# Patient Record
Sex: FEMALE | Race: WHITE | NOT HISPANIC OR LATINO | Employment: PART TIME | URBAN - METROPOLITAN AREA
[De-identification: names, ages, dates, MRNs, and addresses within clinical notes are randomized per-mention and may not be internally consistent; named-entity substitution may affect disease eponyms.]

---

## 2022-03-16 ENCOUNTER — TELEPHONE (OUTPATIENT)
Dept: SCHEDULING | Facility: IMAGING CENTER | Age: 22
End: 2022-03-16

## 2022-03-21 ENCOUNTER — HOSPITAL ENCOUNTER (OUTPATIENT)
Facility: MEDICAL CENTER | Age: 22
End: 2022-03-21
Attending: CLINICAL NURSE SPECIALIST
Payer: COMMERCIAL

## 2022-03-21 ENCOUNTER — OFFICE VISIT (OUTPATIENT)
Dept: MEDICAL GROUP | Facility: IMAGING CENTER | Age: 22
End: 2022-03-21
Payer: COMMERCIAL

## 2022-03-21 ENCOUNTER — HOSPITAL ENCOUNTER (OUTPATIENT)
Dept: LAB | Facility: MEDICAL CENTER | Age: 22
End: 2022-03-21
Attending: CLINICAL NURSE SPECIALIST
Payer: COMMERCIAL

## 2022-03-21 VITALS
TEMPERATURE: 97.9 F | HEIGHT: 63 IN | HEART RATE: 77 BPM | BODY MASS INDEX: 31.01 KG/M2 | OXYGEN SATURATION: 97 % | DIASTOLIC BLOOD PRESSURE: 50 MMHG | WEIGHT: 175 LBS | SYSTOLIC BLOOD PRESSURE: 104 MMHG

## 2022-03-21 DIAGNOSIS — Z13.31 DEPRESSION SCREENING: ICD-10-CM

## 2022-03-21 DIAGNOSIS — Z76.89 ENCOUNTER TO ESTABLISH CARE WITH NEW DOCTOR: ICD-10-CM

## 2022-03-21 DIAGNOSIS — F41.9 ANXIETY AND DEPRESSION: ICD-10-CM

## 2022-03-21 DIAGNOSIS — Z11.3 SCREENING EXAMINATION FOR SEXUALLY TRANSMITTED DISEASE: ICD-10-CM

## 2022-03-21 DIAGNOSIS — Z20.5 EXPOSURE TO HEPATITIS B: ICD-10-CM

## 2022-03-21 DIAGNOSIS — F32.A ANXIETY AND DEPRESSION: ICD-10-CM

## 2022-03-21 DIAGNOSIS — R79.89 HIGH SERUM TRIIODOTHYRONINE (T3): ICD-10-CM

## 2022-03-21 DIAGNOSIS — N92.6 IRREGULAR PERIODS: ICD-10-CM

## 2022-03-21 LAB
AMBIGUOUS DTTM AMBI4: NORMAL
BASOPHILS # BLD AUTO: 0.9 % (ref 0–1.8)
BASOPHILS # BLD: 0.05 K/UL (ref 0–0.12)
EOSINOPHIL # BLD AUTO: 0.11 K/UL (ref 0–0.51)
EOSINOPHIL NFR BLD: 2.1 % (ref 0–6.9)
ERYTHROCYTE [DISTWIDTH] IN BLOOD BY AUTOMATED COUNT: 41.1 FL (ref 35.9–50)
EST. AVERAGE GLUCOSE BLD GHB EST-MCNC: 103 MG/DL
HAV IGM SERPL QL IA: NORMAL
HBA1C MFR BLD: 5.2 % (ref 4–5.6)
HBV CORE IGM SER QL: NORMAL
HBV SURFACE AG SER QL: NORMAL
HCT VFR BLD AUTO: 43.6 % (ref 37–47)
HCV AB SER QL: NORMAL
HGB BLD-MCNC: 14.7 G/DL (ref 12–16)
IMM GRANULOCYTES # BLD AUTO: 0.01 K/UL (ref 0–0.11)
IMM GRANULOCYTES NFR BLD AUTO: 0.2 % (ref 0–0.9)
LYMPHOCYTES # BLD AUTO: 1.71 K/UL (ref 1–4.8)
LYMPHOCYTES NFR BLD: 32.3 % (ref 22–41)
MCH RBC QN AUTO: 31.1 PG (ref 27–33)
MCHC RBC AUTO-ENTMCNC: 33.7 G/DL (ref 33.6–35)
MCV RBC AUTO: 92.2 FL (ref 81.4–97.8)
MONOCYTES # BLD AUTO: 0.38 K/UL (ref 0–0.85)
MONOCYTES NFR BLD AUTO: 7.2 % (ref 0–13.4)
NEUTROPHILS # BLD AUTO: 3.04 K/UL (ref 2–7.15)
NEUTROPHILS NFR BLD: 57.3 % (ref 44–72)
NRBC # BLD AUTO: 0 K/UL
NRBC BLD-RTO: 0 /100 WBC
PLATELET # BLD AUTO: 281 K/UL (ref 164–446)
PMV BLD AUTO: 9.6 FL (ref 9–12.9)
RBC # BLD AUTO: 4.73 M/UL (ref 4.2–5.4)
T PALLIDUM AB SER QL IA: NORMAL
WBC # BLD AUTO: 5.3 K/UL (ref 4.8–10.8)

## 2022-03-21 PROCEDURE — 87389 HIV-1 AG W/HIV-1&-2 AB AG IA: CPT

## 2022-03-21 PROCEDURE — 99204 OFFICE O/P NEW MOD 45 MIN: CPT | Performed by: CLINICAL NURSE SPECIALIST

## 2022-03-21 PROCEDURE — 87491 CHLMYD TRACH DNA AMP PROBE: CPT

## 2022-03-21 PROCEDURE — 84443 ASSAY THYROID STIM HORMONE: CPT

## 2022-03-21 PROCEDURE — 87591 N.GONORRHOEAE DNA AMP PROB: CPT

## 2022-03-21 PROCEDURE — 86780 TREPONEMA PALLIDUM: CPT

## 2022-03-21 PROCEDURE — 82306 VITAMIN D 25 HYDROXY: CPT

## 2022-03-21 PROCEDURE — 85025 COMPLETE CBC W/AUTO DIFF WBC: CPT

## 2022-03-21 PROCEDURE — 84439 ASSAY OF FREE THYROXINE: CPT

## 2022-03-21 PROCEDURE — 84442 ASSAY OF THYROID ACTIVITY: CPT

## 2022-03-21 PROCEDURE — 36415 COLL VENOUS BLD VENIPUNCTURE: CPT

## 2022-03-21 PROCEDURE — 80074 ACUTE HEPATITIS PANEL: CPT

## 2022-03-21 PROCEDURE — 84480 ASSAY TRIIODOTHYRONINE (T3): CPT

## 2022-03-21 PROCEDURE — 83036 HEMOGLOBIN GLYCOSYLATED A1C: CPT

## 2022-03-21 RX ORDER — NORETHINDRONE ACETATE AND ETHINYL ESTRADIOL 1MG-20(21)
KIT ORAL
COMMUNITY
Start: 2022-03-06 | End: 2022-06-10 | Stop reason: SDUPTHER

## 2022-03-21 SDOH — ECONOMIC STABILITY: HOUSING INSECURITY
IN THE LAST 12 MONTHS, WAS THERE A TIME WHEN YOU DID NOT HAVE A STEADY PLACE TO SLEEP OR SLEPT IN A SHELTER (INCLUDING NOW)?: NO

## 2022-03-21 SDOH — ECONOMIC STABILITY: TRANSPORTATION INSECURITY
IN THE PAST 12 MONTHS, HAS LACK OF TRANSPORTATION KEPT YOU FROM MEETINGS, WORK, OR FROM GETTING THINGS NEEDED FOR DAILY LIVING?: NO

## 2022-03-21 SDOH — ECONOMIC STABILITY: INCOME INSECURITY: IN THE LAST 12 MONTHS, WAS THERE A TIME WHEN YOU WERE NOT ABLE TO PAY THE MORTGAGE OR RENT ON TIME?: NO

## 2022-03-21 SDOH — ECONOMIC STABILITY: TRANSPORTATION INSECURITY
IN THE PAST 12 MONTHS, HAS LACK OF RELIABLE TRANSPORTATION KEPT YOU FROM MEDICAL APPOINTMENTS, MEETINGS, WORK OR FROM GETTING THINGS NEEDED FOR DAILY LIVING?: NO

## 2022-03-21 SDOH — ECONOMIC STABILITY: FOOD INSECURITY: WITHIN THE PAST 12 MONTHS, YOU WORRIED THAT YOUR FOOD WOULD RUN OUT BEFORE YOU GOT MONEY TO BUY MORE.: NEVER TRUE

## 2022-03-21 SDOH — ECONOMIC STABILITY: HOUSING INSECURITY: IN THE LAST 12 MONTHS, HOW MANY PLACES HAVE YOU LIVED?: 1

## 2022-03-21 SDOH — ECONOMIC STABILITY: FOOD INSECURITY: WITHIN THE PAST 12 MONTHS, THE FOOD YOU BOUGHT JUST DIDN'T LAST AND YOU DIDN'T HAVE MONEY TO GET MORE.: NEVER TRUE

## 2022-03-21 SDOH — HEALTH STABILITY: PHYSICAL HEALTH: ON AVERAGE, HOW MANY MINUTES DO YOU ENGAGE IN EXERCISE AT THIS LEVEL?: 40 MIN

## 2022-03-21 SDOH — ECONOMIC STABILITY: TRANSPORTATION INSECURITY
IN THE PAST 12 MONTHS, HAS THE LACK OF TRANSPORTATION KEPT YOU FROM MEDICAL APPOINTMENTS OR FROM GETTING MEDICATIONS?: NO

## 2022-03-21 SDOH — HEALTH STABILITY: MENTAL HEALTH
STRESS IS WHEN SOMEONE FEELS TENSE, NERVOUS, ANXIOUS, OR CAN'T SLEEP AT NIGHT BECAUSE THEIR MIND IS TROUBLED. HOW STRESSED ARE YOU?: RATHER MUCH

## 2022-03-21 SDOH — ECONOMIC STABILITY: INCOME INSECURITY: HOW HARD IS IT FOR YOU TO PAY FOR THE VERY BASICS LIKE FOOD, HOUSING, MEDICAL CARE, AND HEATING?: NOT VERY HARD

## 2022-03-21 SDOH — HEALTH STABILITY: PHYSICAL HEALTH: ON AVERAGE, HOW MANY DAYS PER WEEK DO YOU ENGAGE IN MODERATE TO STRENUOUS EXERCISE (LIKE A BRISK WALK)?: 2 DAYS

## 2022-03-21 ASSESSMENT — ANXIETY QUESTIONNAIRES
6. BECOMING EASILY ANNOYED OR IRRITABLE: NOT AT ALL
5. BEING SO RESTLESS THAT IT IS HARD TO SIT STILL: NOT AT ALL
4. TROUBLE RELAXING: NEARLY EVERY DAY
GAD7 TOTAL SCORE: 9
7. FEELING AFRAID AS IF SOMETHING AWFUL MIGHT HAPPEN: SEVERAL DAYS
1. FEELING NERVOUS, ANXIOUS, OR ON EDGE: SEVERAL DAYS
2. NOT BEING ABLE TO STOP OR CONTROL WORRYING: SEVERAL DAYS
3. WORRYING TOO MUCH ABOUT DIFFERENT THINGS: NEARLY EVERY DAY

## 2022-03-21 ASSESSMENT — SOCIAL DETERMINANTS OF HEALTH (SDOH)
HOW OFTEN DO YOU ATTENT MEETINGS OF THE CLUB OR ORGANIZATION YOU BELONG TO?: NEVER
DO YOU BELONG TO ANY CLUBS OR ORGANIZATIONS SUCH AS CHURCH GROUPS UNIONS, FRATERNAL OR ATHLETIC GROUPS, OR SCHOOL GROUPS?: NO
HOW MANY DRINKS CONTAINING ALCOHOL DO YOU HAVE ON A TYPICAL DAY WHEN YOU ARE DRINKING: PATIENT DECLINED
HOW OFTEN DO YOU ATTEND CHURCH OR RELIGIOUS SERVICES?: NEVER
IN A TYPICAL WEEK, HOW MANY TIMES DO YOU TALK ON THE PHONE WITH FAMILY, FRIENDS, OR NEIGHBORS?: MORE THAN THREE TIMES A WEEK
ARE YOU MARRIED, WIDOWED, DIVORCED, SEPARATED, NEVER MARRIED, OR LIVING WITH A PARTNER?: NEVER MARRIED
HOW OFTEN DO YOU GET TOGETHER WITH FRIENDS OR RELATIVES?: ONCE A WEEK
HOW OFTEN DO YOU ATTEND CHURCH OR RELIGIOUS SERVICES?: NEVER
HOW OFTEN DO YOU HAVE A DRINK CONTAINING ALCOHOL: NEVER
IN A TYPICAL WEEK, HOW MANY TIMES DO YOU TALK ON THE PHONE WITH FAMILY, FRIENDS, OR NEIGHBORS?: MORE THAN THREE TIMES A WEEK
DO YOU BELONG TO ANY CLUBS OR ORGANIZATIONS SUCH AS CHURCH GROUPS UNIONS, FRATERNAL OR ATHLETIC GROUPS, OR SCHOOL GROUPS?: NO
ARE YOU MARRIED, WIDOWED, DIVORCED, SEPARATED, NEVER MARRIED, OR LIVING WITH A PARTNER?: NEVER MARRIED
HOW OFTEN DO YOU ATTENT MEETINGS OF THE CLUB OR ORGANIZATION YOU BELONG TO?: NEVER
HOW HARD IS IT FOR YOU TO PAY FOR THE VERY BASICS LIKE FOOD, HOUSING, MEDICAL CARE, AND HEATING?: NOT VERY HARD
HOW OFTEN DO YOU GET TOGETHER WITH FRIENDS OR RELATIVES?: ONCE A WEEK
HOW OFTEN DO YOU HAVE SIX OR MORE DRINKS ON ONE OCCASION: NEVER
WITHIN THE PAST 12 MONTHS, YOU WORRIED THAT YOUR FOOD WOULD RUN OUT BEFORE YOU GOT THE MONEY TO BUY MORE: NEVER TRUE

## 2022-03-21 ASSESSMENT — PAIN SCALES - GENERAL: PAINLEVEL: NO PAIN

## 2022-03-21 ASSESSMENT — LIFESTYLE VARIABLES
HOW OFTEN DO YOU HAVE A DRINK CONTAINING ALCOHOL: NEVER
HOW OFTEN DO YOU HAVE SIX OR MORE DRINKS ON ONE OCCASION: NEVER
HOW MANY STANDARD DRINKS CONTAINING ALCOHOL DO YOU HAVE ON A TYPICAL DAY: PATIENT DECLINED

## 2022-03-21 ASSESSMENT — PATIENT HEALTH QUESTIONNAIRE - PHQ9
5. POOR APPETITE OR OVEREATING: 1 - SEVERAL DAYS
CLINICAL INTERPRETATION OF PHQ2 SCORE: 1
SUM OF ALL RESPONSES TO PHQ QUESTIONS 1-9: 5

## 2022-03-21 NOTE — ASSESSMENT & PLAN NOTE
In past 3 months, breakthrough bleeding and skipped menses on birth control pill. No cramping.  No excessive body hair or deep voice.  No family history of PCOS.  Personal history of irregular thyroid per university provider.

## 2022-03-21 NOTE — PROGRESS NOTES
Subjective     Courtney Gayle is a 21 y.o. female who presents with Establish Care and Requesting Labs (Thyroid concern, has partner who has Hep B requesting booster vaccine)            HPI  BMI 31.0-31.9,adult  Weight gain without change in lifestyle.   Cold sensation especially feet and hands.  No fatigue, hair loss, constipation, dry skin, hair thinning, palpitations. Walks on campus but no exercise otherwise.  No dietary restrictions.  Meal preps one meal for week, last week mushroom chicken.  Sweets, fruit juice.  Minimal fried food.  No soda. No alcohol or cigarettes.      Anxiety and depression  History of intermittent anxiety and depression. Currently higher stress with school and work. She has seen therapy but has not been treated with medication.  She feels she is able to manage this herself and is not interested in any intervention at this time.     Irregular periods  In past 3 months, breakthrough bleeding and skipped menses on birth control pill. No cramping.  No excessive body hair or deep voice.  No family history of PCOS.  Personal history of irregular thyroid per university provider.     Exposure to hepatitis B  Courtney's boyfriend is positive for Hepatitis B. IN 2020, her HBV antibodies were negative despite vaccination.  She last saw her boyfriend 2 months ago.  She does not complain of abdominal pain.      ROS  See HPI      No Known Allergies    Current Outpatient Medications on File Prior to Visit   Medication Sig Dispense Refill   • BLISOVI FE 1/20 1-20 MG-MCG per tablet        No current facility-administered medications on file prior to visit.     Depression Screening    Little interest or pleasure in doing things?  0 - not at all   Feeling down, depressed , or hopeless? 1 - several days   Trouble falling or staying asleep, or sleeping too much?  0 - not at all   Feeling tired or having little energy?  2 - more than half the days   Poor appetite or overeating?  1 - several days   Feeling bad  "about yourself - or that you are a failure or have let yourself or your family down? 0 - not at all   Trouble concentrating on things, such as reading the newspaper or watching television? 1 - several days   Moving or speaking so slowly that other people could have noticed.  Or the opposite - being so fidgety or restless that you have been moving around a lot more than usual?  0 - not at all   Thoughts that you would be better off dead, or of hurting yourself?  0 - not at all   Patient Health Questionnaire Score: 5       If depressive symptoms identified deferred to follow up visit unless specifically addressed in assesment and plan.    Interpretation of PHQ-9 Total Score   Score Severity   1-4 No Depression   5-9 Mild Depression   10-14 Moderate Depression   15-19 Moderately Severe Depression   20-27 Severe Depression    SEBASTIÁN-7 Questionnaire    Feeling nervous, anxious, or on edge: Several days  Not being able to sop or control worrying: Several days  Worrying too much about different things: Nearly every day  Trouble relaxing: Nearly every day  Being so restless that it's hard to sit still: Not at all  Becoming easily annoyed or irritable: Not at all  Feeling afraid as if something awful might happen: Several days  Total: 9    Interpretation of SEBASTIÁN 7 Total Score   Score Severity :  0-4 No Anxiety   5-9 Mild Anxiety  10-14 Moderate Anxiety  15-21 Severe Anxiety          Objective     /50 (BP Location: Left arm, Patient Position: Sitting, BP Cuff Size: Adult)   Pulse 77   Temp 36.6 °C (97.9 °F) (Temporal)   Ht 1.6 m (5' 3\")   Wt 79.4 kg (175 lb)   LMP 02/02/2022   SpO2 97%   BMI 31.00 kg/m²      Physical Exam  Constitutional:       General: She is not in acute distress.     Appearance: Normal appearance. She is not ill-appearing, toxic-appearing or diaphoretic.   HENT:      Head: Normocephalic and atraumatic.   Eyes:      General: No scleral icterus.     Pupils: Pupils are equal, round, and reactive to " light.   Neck:      Comments: Possible thyromegaly  Cardiovascular:      Rate and Rhythm: Normal rate and regular rhythm.      Heart sounds: Normal heart sounds.   Pulmonary:      Effort: Pulmonary effort is normal.      Breath sounds: Normal breath sounds.   Lymphadenopathy:      Cervical: No cervical adenopathy.   Skin:     General: Skin is warm and dry.   Neurological:      Mental Status: She is alert and oriented to person, place, and time.      Gait: Gait normal.   Psychiatric:         Mood and Affect: Mood normal.         Behavior: Behavior normal.         Thought Content: Thought content normal.         Judgment: Judgment normal.                     Assessment & Plan        1. Encounter to establish care with new doctor      2. Depression screening  PHQ9 score 5, GAD7 score9    3. Exposure to hepatitis B  Letter from previous provider reviewed and stated no antibodies to hepatitis B.  Hepatitis panel ordered.  Will defer revaccination until labs resulted.   - HEPATITIS PANEL ACUTE (4 COMPONENTS); Future    4. BMI 31.0-31.9,adult  Possible thyromegaly on palpation.  Likely at least partially related to minimal exercise and poor diet.  Labs ordered.    - TSH; Future  - FREE THYROXINE; Future  - TRIIDOTHYRONINE; Future  - CBC WITH DIFFERENTIAL; Future  - Comp Metabolic Panel; Future  - HEMOGLOBIN A1C; Future  - Lipid Profile; Future  - VITAMIN D,25 HYDROXY; Future    5. Screening examination for sexually transmitted disease    - Chlamydia/GC PCR (Urine); Future  - HIV AG/AB COMBO ASSAY SCREENING; Future  -Syphillis    6. Anxiety and depression  Monitor    7. Irregular periods  Labs ordered.    Return if symptoms worsen or fail to improve, for With test results.

## 2022-03-21 NOTE — ASSESSMENT & PLAN NOTE
Courtney's boyfriend is positive for Hepatitis B. IN 2020, her HBV antibodies were negative despite vaccination.  She last saw her boyfriend 2 months ago.  She does not complain of abdominal pain.

## 2022-03-21 NOTE — ASSESSMENT & PLAN NOTE
History of intermittent anxiety and depression. Currently higher stress with school and work. She has seen therapy but has not been treated with medication.  She feels she is able to manage this herself and is not interested in any intervention at this time.

## 2022-03-21 NOTE — ASSESSMENT & PLAN NOTE
Weight gain without change in lifestyle.   Cold sensation especially feet and hands.  No fatigue, hair loss, constipation, dry skin, hair thinning, palpitations. Walks on campus but no exercise otherwise.  No dietary restrictions.  Meal preps one meal for week, last week mushroom chicken.  Sweets, fruit juice.  Minimal fried food.  No soda. No alcohol or cigarettes.

## 2022-03-22 LAB
25(OH)D3 SERPL-MCNC: 19 NG/ML (ref 30–100)
C TRACH DNA SPEC QL NAA+PROBE: NEGATIVE
HIV 1+2 AB+HIV1 P24 AG SERPL QL IA: NORMAL
N GONORRHOEA DNA SPEC QL NAA+PROBE: NEGATIVE
SPECIMEN SOURCE: NORMAL
T3 SERPL-MCNC: 186 NG/DL (ref 60–181)
T4 FREE SERPL-MCNC: 1.09 NG/DL (ref 0.93–1.7)
TSH SERPL DL<=0.005 MIU/L-ACNC: 2.45 UIU/ML (ref 0.38–5.33)

## 2022-03-27 LAB — T4BG SERPL-MCNC: 32.7 UG/ML (ref 13–30)

## 2022-03-30 ENCOUNTER — TELEPHONE (OUTPATIENT)
Dept: MEDICAL GROUP | Facility: IMAGING CENTER | Age: 22
End: 2022-03-30
Payer: COMMERCIAL

## 2022-03-30 NOTE — TELEPHONE ENCOUNTER
----- Message from Trina Heard sent at 3/30/2022  8:49 AM PDT -----  Regarding: Labs  Called pt to try and schedule for this afternoon. Pt is not available til Monday. She is asking if more labs are needed before that appointment, something was mentioned to her about needing more tests after. Please contact pt.    Thank you

## 2022-04-04 ENCOUNTER — OFFICE VISIT (OUTPATIENT)
Dept: MEDICAL GROUP | Facility: IMAGING CENTER | Age: 22
End: 2022-04-04
Payer: COMMERCIAL

## 2022-04-04 VITALS
TEMPERATURE: 97.1 F | DIASTOLIC BLOOD PRESSURE: 44 MMHG | OXYGEN SATURATION: 95 % | WEIGHT: 176 LBS | SYSTOLIC BLOOD PRESSURE: 104 MMHG | HEIGHT: 63 IN | HEART RATE: 78 BPM | BODY MASS INDEX: 31.18 KG/M2

## 2022-04-04 DIAGNOSIS — J30.2 SEASONAL ALLERGIES: ICD-10-CM

## 2022-04-04 DIAGNOSIS — E55.9 VITAMIN D DEFICIENCY: ICD-10-CM

## 2022-04-04 DIAGNOSIS — N92.6 IRREGULAR PERIODS: ICD-10-CM

## 2022-04-04 DIAGNOSIS — Z20.5 EXPOSURE TO HEPATITIS B: ICD-10-CM

## 2022-04-04 DIAGNOSIS — R79.89 HIGH SERUM TRIIODOTHYRONINE (T3): ICD-10-CM

## 2022-04-04 PROCEDURE — 90471 IMMUNIZATION ADMIN: CPT | Performed by: CLINICAL NURSE SPECIALIST

## 2022-04-04 PROCEDURE — 99214 OFFICE O/P EST MOD 30 MIN: CPT | Mod: 25 | Performed by: CLINICAL NURSE SPECIALIST

## 2022-04-04 PROCEDURE — 90746 HEPB VACCINE 3 DOSE ADULT IM: CPT | Performed by: CLINICAL NURSE SPECIALIST

## 2022-04-04 RX ORDER — FEXOFENADINE HCL 60 MG/1
60 TABLET, FILM COATED ORAL DAILY
COMMUNITY
End: 2022-06-14

## 2022-04-04 ASSESSMENT — PAIN SCALES - GENERAL: PAINLEVEL: NO PAIN

## 2022-04-04 NOTE — ASSESSMENT & PLAN NOTE
Changed birth control and has had multiple missed periods and one episode of breakthrough bleeding.  No pain.  Not seeing bf until August.

## 2022-04-04 NOTE — PROGRESS NOTES
"Subjective     Courtney Gayle is a 21 y.o. female who presents with No chief complaint on file.            HPI  Weight gain and cold sensation present.  Elevated thyroxine binding globulin and T3.  Labs reviewed. Denies any constipation, diarrhea, dry skin or hair, thinning eyebrows, palpitations, hot sensation.    Seasonal allergies  Acting up recently.  Taking Allegra which helps.     Vitamin D deficiency  Vitamin D at 19.  She will go and purchase Vitamin D 5000IU.    Irregular periods  Changed birth control and has had multiple missed periods and one episode of breakthrough bleeding.  No pain.  Not seeing bf until August.       ROS  See HPI      No Known Allergies    Current Outpatient Medications on File Prior to Visit   Medication Sig Dispense Refill   • fexofenadine (ALLEGRA) 60 MG Tab Take 60 mg by mouth every day.     • BLISOVI FE 1/20 1-20 MG-MCG per tablet        No current facility-administered medications on file prior to visit.           Objective     /44 (BP Location: Left arm, Patient Position: Sitting, BP Cuff Size: Adult)   Pulse 78   Temp 36.2 °C (97.1 °F) (Temporal)   Ht 1.6 m (5' 3\")   Wt 79.8 kg (176 lb)   LMP 03/22/2022 (Exact Date)   SpO2 95%   BMI 31.18 kg/m²      Physical Exam  Constitutional:       General: She is not in acute distress.     Appearance: Normal appearance. She is not ill-appearing, toxic-appearing or diaphoretic.   HENT:      Head: Normocephalic and atraumatic.   Eyes:      Pupils: Pupils are equal, round, and reactive to light.   Cardiovascular:      Rate and Rhythm: Normal rate.   Pulmonary:      Effort: Pulmonary effort is normal.   Skin:     General: Skin is warm and dry.   Neurological:      Mental Status: She is alert and oriented to person, place, and time.      Gait: Gait normal.   Psychiatric:         Mood and Affect: Mood normal.         Behavior: Behavior normal.         Thought Content: Thought content normal.         Judgment: Judgment normal. "                             Assessment & Plan        1. Seasonal allergies  Continue antihistamines.    2. Vitamin D deficiency  Take Vitamin D 5000IU daily.    3. High serum triiodothyronine (T3)  Repeat testing in one month approximately.    - TSH; Future  - FREE THYROXINE; Future  - TRIIDOTHYRONINE; Future  - THYROXINE BINDING GLOBULIN; Future  - THYROID PEROXIDASE  (TPO) AB; Future  - ANTITHYROGLOBULIN AB; Future    4. BMI 31.0-31.9,adult  Not completed last time as not fasting. Complete at next draw.    - Lipid Profile; Future  - Comp Metabolic Panel; Future    5. Exposure to hepatitis B  First dose hep B today.    - Hep B Adult 20+    6. Irregular periods  Stop BCP until July and monitor menstruation.     Return in about 1 month (around 5/4/2022), or if symptoms worsen or fail to improve, for With test results.

## 2022-05-06 ENCOUNTER — NON-PROVIDER VISIT (OUTPATIENT)
Dept: MEDICAL GROUP | Facility: IMAGING CENTER | Age: 22
End: 2022-05-06
Payer: COMMERCIAL

## 2022-05-06 DIAGNOSIS — Z23 NEED FOR VACCINATION: ICD-10-CM

## 2022-05-06 PROCEDURE — 90746 HEPB VACCINE 3 DOSE ADULT IM: CPT | Performed by: CLINICAL NURSE SPECIALIST

## 2022-05-06 PROCEDURE — 90471 IMMUNIZATION ADMIN: CPT | Performed by: CLINICAL NURSE SPECIALIST

## 2022-05-06 NOTE — PROGRESS NOTES
"Courtney Gayle is a 21 y.o. female here for a non-provider visit for:   HEPATITIS B 2 of 3    Reason for immunization: continue or complete series started at the office  Immunization records indicate need for vaccine: Yes, confirmed with Epic  Minimum interval has been met for this vaccine: Yes  ABN completed: Not Indicated    VIS was given to patient: Yes  All IAC Questionnaire questions were answered \"No.\"    Patient tolerated injection and no adverse effects were observed or reported: Yes    Pt scheduled for next dose in series: No  "

## 2022-06-14 RX ORDER — NORETHINDRONE ACETATE AND ETHINYL ESTRADIOL 1MG-20(21)
1 KIT ORAL DAILY
Qty: 28 TABLET | Refills: 2 | Status: SHIPPED | OUTPATIENT
Start: 2022-06-14 | End: 2022-07-07 | Stop reason: SDUPTHER

## 2022-07-07 ENCOUNTER — TELEMEDICINE (OUTPATIENT)
Dept: MEDICAL GROUP | Facility: IMAGING CENTER | Age: 22
End: 2022-07-07
Payer: COMMERCIAL

## 2022-07-07 VITALS — WEIGHT: 170 LBS | BODY MASS INDEX: 30.12 KG/M2 | HEIGHT: 63 IN

## 2022-07-07 DIAGNOSIS — F41.9 ANXIETY AND DEPRESSION: ICD-10-CM

## 2022-07-07 DIAGNOSIS — Z30.019 ENCOUNTER FOR FEMALE BIRTH CONTROL: ICD-10-CM

## 2022-07-07 DIAGNOSIS — E55.9 VITAMIN D DEFICIENCY: ICD-10-CM

## 2022-07-07 DIAGNOSIS — R79.89 HIGH SERUM TRIIODOTHYRONINE (T3): ICD-10-CM

## 2022-07-07 DIAGNOSIS — F32.A ANXIETY AND DEPRESSION: ICD-10-CM

## 2022-07-07 DIAGNOSIS — N92.6 IRREGULAR PERIODS: ICD-10-CM

## 2022-07-07 PROCEDURE — 99214 OFFICE O/P EST MOD 30 MIN: CPT | Mod: 95 | Performed by: CLINICAL NURSE SPECIALIST

## 2022-07-07 RX ORDER — NORETHINDRONE ACETATE AND ETHINYL ESTRADIOL 1MG-20(21)
1 KIT ORAL DAILY
Qty: 84 TABLET | Refills: 0 | Status: SHIPPED | OUTPATIENT
Start: 2022-07-07 | End: 2022-09-27

## 2022-07-07 ASSESSMENT — PAIN SCALES - GENERAL: PAINLEVEL: NO PAIN

## 2022-07-07 NOTE — PROGRESS NOTES
Telemedicine: Established Patient   This evaluation was conducted via Zoom using secure and encrypted videoconferencing technology. The patient was in their home in the [facility].    The patient's identity was confirmed and verbal consent was obtained for this virtual visit.    Subjective:   CC:   Chief Complaint   Patient presents with   • Contraception     Birth control renewal        HPI:  Vitamin D deficiency  Courtney is taking vitamin D currently.      Irregular periods  Courtney did not stop the birth control.  Menstruation has improved and is more regular now.  She has been exercising more.       High serum triiodothyronine (T3)  Continues with anxiety. Menstruation has improved.        ROS  See HPI    No Known Allergies    Current medicines (including changes today)  Current Outpatient Medications   Medication Sig Dispense Refill   • B Complex Vitamins (VITAMIN-B COMPLEX PO) Take  by mouth.     • BLISOVI FE 1/20 1-20 MG-MCG per tablet Take 1 Tablet by mouth every day. 84 Tablet 0     No current facility-administered medications for this visit.       Patient Active Problem List    Diagnosis Date Noted   • High serum triiodothyronine (T3) 07/07/2022   • Seasonal allergies 04/04/2022   • Vitamin D deficiency 04/04/2022   • BMI 31.0-31.9,adult 03/21/2022   • Anxiety and depression 03/21/2022   • Irregular periods 03/21/2022   • Exposure to hepatitis B 03/21/2022         History reviewed. No pertinent family history.    She  has no past medical history on file.  She  has no past surgical history on file.       Objective:     Physical Exam     Vitals obtained by patient:  There were no vitals filed for this visit.       Constitutional: Alert, no distress, well-groomed.  Skin: No rashes in visible areas.  Eye: Round. Conjunctiva clear, lids normal. No icterus.   ENMT: Lips pink without lesions, good dentition, moist mucous membranes. Phonation normal.  Neck: No masses, no thyromegaly. Moves freely without  pain.  Respiratory: Unlabored respiratory effort, no cough or audible wheeze  Psych: Alert and oriented x4, normal affect and mood.     Assessment and Plan:   The following treatment plan was discussed:     1. Vitamin D deficiency  Continue vitamin D replacement    2. Irregular periods  Regulated. Continue BCP.    3. Anxiety and depression  Anxiety has continued despite school year being completed. She would like a referral to behavioral health and will reach out if she decides she would like medication management.    - Referral to Behavioral Health    4. Encounter for female birth control  Tolerating well.  Menstruation has regulated.    - BLISOVI FE 1/20 1-20 MG-MCG per tablet; Take 1 Tablet by mouth every day.  Dispense: 84 Tablet; Refill: 0    5. High serum triiodothyronine (T3)  Repeat labs.    Follow-up: Return if symptoms worsen or fail to improve, for With test results.

## 2022-07-07 NOTE — ASSESSMENT & PLAN NOTE
Courtney did not stop the birth control.  Menstruation has improved and is more regular now.  She has been exercising more.

## 2022-11-10 ENCOUNTER — HOSPITAL ENCOUNTER (OUTPATIENT)
Facility: MEDICAL CENTER | Age: 22
End: 2022-11-10
Attending: CLINICAL NURSE SPECIALIST
Payer: COMMERCIAL

## 2022-11-10 ENCOUNTER — OFFICE VISIT (OUTPATIENT)
Dept: MEDICAL GROUP | Facility: IMAGING CENTER | Age: 22
End: 2022-11-10
Payer: COMMERCIAL

## 2022-11-10 VITALS
SYSTOLIC BLOOD PRESSURE: 110 MMHG | BODY MASS INDEX: 32.28 KG/M2 | DIASTOLIC BLOOD PRESSURE: 68 MMHG | HEIGHT: 63 IN | OXYGEN SATURATION: 99 % | TEMPERATURE: 97.5 F | WEIGHT: 182.2 LBS | HEART RATE: 78 BPM

## 2022-11-10 DIAGNOSIS — J02.9 ACUTE PHARYNGITIS, UNSPECIFIED ETIOLOGY: ICD-10-CM

## 2022-11-10 DIAGNOSIS — R05.1 ACUTE COUGH: ICD-10-CM

## 2022-11-10 LAB
EXTERNAL QUALITY CONTROL: NORMAL
FLUAV+FLUBV AG SPEC QL IA: NEGATIVE
INT CON NEG: NORMAL
INT CON POS: NORMAL
S PYO AG THROAT QL: NEGATIVE
SARS-COV+SARS-COV-2 AG RESP QL IA.RAPID: NEGATIVE

## 2022-11-10 PROCEDURE — 99214 OFFICE O/P EST MOD 30 MIN: CPT | Performed by: CLINICAL NURSE SPECIALIST

## 2022-11-10 PROCEDURE — U0005 INFEC AGEN DETEC AMPLI PROBE: HCPCS

## 2022-11-10 PROCEDURE — 87880 STREP A ASSAY W/OPTIC: CPT | Performed by: CLINICAL NURSE SPECIALIST

## 2022-11-10 PROCEDURE — 87804 INFLUENZA ASSAY W/OPTIC: CPT | Performed by: CLINICAL NURSE SPECIALIST

## 2022-11-10 PROCEDURE — U0003 INFECTIOUS AGENT DETECTION BY NUCLEIC ACID (DNA OR RNA); SEVERE ACUTE RESPIRATORY SYNDROME CORONAVIRUS 2 (SARS-COV-2) (CORONAVIRUS DISEASE [COVID-19]), AMPLIFIED PROBE TECHNIQUE, MAKING USE OF HIGH THROUGHPUT TECHNOLOGIES AS DESCRIBED BY CMS-2020-01-R: HCPCS

## 2022-11-10 PROCEDURE — 87070 CULTURE OTHR SPECIMN AEROBIC: CPT

## 2022-11-10 PROCEDURE — 87426 SARSCOV CORONAVIRUS AG IA: CPT | Performed by: CLINICAL NURSE SPECIALIST

## 2022-11-10 ASSESSMENT — PAIN SCALES - GENERAL: PAINLEVEL: 4=SLIGHT-MODERATE PAIN

## 2022-11-10 NOTE — PROGRESS NOTES
"Subjective     Courtney Gayle is a 21 y.o. female who presents with Fatigue, Pharyngitis (All started Sunday ), Headache, and Nasal Congestion            HPI      ROS  See HPI    No Known Allergies    Current Outpatient Medications on File Prior to Visit   Medication Sig Dispense Refill   • BLISOVI FE 1/20 1-20 MG-MCG per tablet TAKE 1 TABLET BY MOUTH EVERY DAY 84 Tablet 3   • B Complex Vitamins (VITAMIN-B COMPLEX PO) Take  by mouth.       No current facility-administered medications on file prior to visit.              Objective     /68 (BP Location: Left arm, Patient Position: Sitting, BP Cuff Size: Adult)   Pulse 78   Temp 36.4 °C (97.5 °F) (Temporal)   Ht 1.6 m (5' 3\")   Wt 82.6 kg (182 lb 3.2 oz)   LMP  (LMP Unknown) Comment: last month, late for nov  SpO2 99%   BMI 32.28 kg/m²      Physical Exam                        Assessment & Plan        There are no diagnoses linked to this encounter.                Chief Complaint   Patient presents with   • Fatigue   • Pharyngitis     All started Sunday    • Headache   • Nasal Congestion        HPI: This is a 21 y.o. patient has *** days of sore throat, cough and congestion. *** runny nose. *** ear fullness. No abnormal shortness of breath. No nausea vomiting or diarrhea. Mild subjective fever and chills. *** sick exposures. No coughing up blood. No headache.  Patient has taken over-the-counter analgesics and decongestant {w-w/o:5700} relief.     ROS:  No fever, cough, nausea, changes in bowel movements or skin rash. ***     I reviewed the patient's medications, allergies and medical history:  Current Outpatient Medications   Medication Sig Dispense Refill   • BLISOVI FE 1/20 1-20 MG-MCG per tablet TAKE 1 TABLET BY MOUTH EVERY DAY 84 Tablet 3   • B Complex Vitamins (VITAMIN-B COMPLEX PO) Take  by mouth.       No current facility-administered medications for this visit.     Patient has no known allergies.  History reviewed. No pertinent past medical " "history.     EXAM:  /68 (BP Location: Left arm, Patient Position: Sitting, BP Cuff Size: Adult)   Pulse 78   Temp 36.4 °C (97.5 °F) (Temporal)   Ht 1.6 m (5' 3\")   Wt 82.6 kg (182 lb 3.2 oz)   SpO2 99%   General: NAD, non-toxic appearance.  Eyes: PERRL, conjunctiva slightly injected, no photophobia or eye discharge.  Ears: Normal pinnae. TM's pearly gray with good landmarks bilaterally.  Nares: Patent with thin, clear mucus.  Sinuses: Non-tender over maxillary and frontal sinuses.  Throat: Erythematous injection with *** enlarged tonsils. *** exudates.   Neck: Supple, with shotty anterior cervical lymphadenopathy.  Lungs: Good air entry bilaterally, clear to auscultation. No wheeze, rhonchi or crackles. Normal respiratory effort.  Heart: Regular rate without murmur.  Abdomen: Soft and non-tender. No hepatosplenomegaly.  Skin: Warm and dry. No rash.     Results for orders placed or performed during the hospital encounter of 03/21/22   Chlamydia/GC PCR (Urine)    Specimen: Urine   Result Value Ref Range    C. trachomatis by PCR Negative Negative    N. gonorrhoeae by PCR Negative Negative    Source Genital    AMBIGUOUS DATE/TIME   Result Value Ref Range    Ambiguous Date/Time See Below:     ***     ASSESSMENT:   1. Acute pharyngitis, unspecified etiology     ***     PLAN:  1. Discussed benign nature of viral upper respiratory infections.  ***Positive strep test in office, treat with azithromycin/PCNV.  2. OTC anti-pyretics and decongestants as needed. Supportive care advised.  3. Follow-up in office or urgent care for worsening symptoms, difficulty breathing, lack of expected recovery, or should new symptoms or problems arise.    "

## 2022-11-10 NOTE — PROGRESS NOTES
"Subjective     Courtney Gayle is a 21 y.o. female who presents with Fatigue, Pharyngitis (All started Sunday ), Headache, and Nasal Congestion            HPI  Today is day 5 of pharyngitis.  Voice change today.  Took [place] [employer] Cold and Flu for a few days and that night she had stomach pain and vomited.  Cough started yesterday with headaches.  Congestion, runny nose.  A little diarrhea and mild chills.  Some shortness of breath with phlegm which is yellow.  No COVID test at home.      Acute cough  Cough started yesterday with yellow phlegm.  Some shortness of breath due to phlegm.    ROS  No otalgia, rash, fever, change in taste or smell.    No Known Allergies    Current Outpatient Medications on File Prior to Visit   Medication Sig Dispense Refill    BLISOVI FE 1/20 1-20 MG-MCG per tablet TAKE 1 TABLET BY MOUTH EVERY DAY 84 Tablet 3    B Complex Vitamins (VITAMIN-B COMPLEX PO) Take  by mouth.       No current facility-administered medications on file prior to visit.              Objective     /68 (BP Location: Left arm, Patient Position: Sitting, BP Cuff Size: Adult)   Pulse 78   Temp 36.4 °C (97.5 °F) (Temporal)   Ht 1.6 m (5' 3\")   Wt 82.6 kg (182 lb 3.2 oz)   LMP  (LMP Unknown) Comment: last month, late for nov  SpO2 99%   BMI 32.28 kg/m²      Physical Exam  Constitutional:       General: She is not in acute distress.     Appearance: Normal appearance. She is not ill-appearing, toxic-appearing or diaphoretic.   HENT:      Head: Normocephalic and atraumatic.      Right Ear: Tympanic membrane normal.      Left Ear: Tympanic membrane normal.      Nose: Congestion and rhinorrhea present.   Eyes:      General: No scleral icterus.     Extraocular Movements: Extraocular movements intact.      Pupils: Pupils are equal, round, and reactive to light.   Cardiovascular:      Rate and Rhythm: Normal rate and regular rhythm.      Heart sounds: Normal heart sounds.   Pulmonary:      Effort: Pulmonary " effort is normal.      Breath sounds: Normal breath sounds.   Skin:     General: Skin is warm and dry.   Neurological:      Mental Status: She is alert and oriented to person, place, and time.      Gait: Gait normal.   Psychiatric:         Mood and Affect: Mood normal.         Behavior: Behavior normal.         Thought Content: Thought content normal.         Judgment: Judgment normal.                           Assessment & Plan      1. Acute pharyngitis, unspecified etiology  For nasal congestion, take hot, steamy showers and use saline nasal spray or a neti pot as needed to clear congestion.  To soothe the throat, drink warm, herbal tea such as Throat Coat or ginger with honey and lemon. Gargle with salt, water and baking soda for sore throat.  For cough, take Mucinex as needed during the day and use throat lozenges such as Ricola.  Use dextromethorphan for cough only at night to allow the body to expel the pathogen during the day.  Hydrate well.  Take 5-10 deep breaths intermittently throughout the day and move the body as tolerated to aid in respiration.    - POCT Rapid Strep A-Negative  - POCT Influenza A/B-Negative  - POCT SARS-COV Antigen LUZ MARINA (Symptomatic only)-Negative  - CULTURE THROAT; Future  -COVID PCR    2. Acute cough  Use Mucinex.  Declined Tessalon pearls today. Drink lots of water.  Report back if no improvement or if symptoms worsen. Educated that coughs frequently last for 3 weeks.     - COVID/SARS CoV-2 PCR; Future    Return if symptoms worsen or fail to improve.

## 2022-11-11 DIAGNOSIS — R05.1 ACUTE COUGH: ICD-10-CM

## 2022-11-11 DIAGNOSIS — J02.9 ACUTE PHARYNGITIS, UNSPECIFIED ETIOLOGY: ICD-10-CM

## 2022-11-11 LAB
COVID ORDER STATUS COVID19: NORMAL
SARS-COV-2 RNA RESP QL NAA+PROBE: NOTDETECTED
SPECIMEN SOURCE: NORMAL

## 2022-11-13 LAB
BACTERIA SPEC RESP CULT: NORMAL
SIGNIFICANT IND 70042: NORMAL
SITE SITE: NORMAL
SOURCE SOURCE: NORMAL

## 2023-02-13 ENCOUNTER — HOSPITAL ENCOUNTER (OUTPATIENT)
Dept: LAB | Facility: MEDICAL CENTER | Age: 23
End: 2023-02-13
Attending: CLINICAL NURSE SPECIALIST
Payer: COMMERCIAL

## 2023-02-13 DIAGNOSIS — R79.89 HIGH SERUM TRIIODOTHYRONINE (T3): ICD-10-CM

## 2023-02-13 LAB
ALBUMIN SERPL BCP-MCNC: 4.3 G/DL (ref 3.2–4.9)
ALBUMIN/GLOB SERPL: 1.7 G/DL
ALP SERPL-CCNC: 65 U/L (ref 30–99)
ALT SERPL-CCNC: 18 U/L (ref 2–50)
ANION GAP SERPL CALC-SCNC: 10 MMOL/L (ref 7–16)
AST SERPL-CCNC: 13 U/L (ref 12–45)
BILIRUB SERPL-MCNC: 0.3 MG/DL (ref 0.1–1.5)
BUN SERPL-MCNC: 14 MG/DL (ref 8–22)
CALCIUM ALBUM COR SERPL-MCNC: 9 MG/DL (ref 8.5–10.5)
CALCIUM SERPL-MCNC: 9.2 MG/DL (ref 8.5–10.5)
CHLORIDE SERPL-SCNC: 101 MMOL/L (ref 96–112)
CHOLEST SERPL-MCNC: 209 MG/DL (ref 100–199)
CO2 SERPL-SCNC: 25 MMOL/L (ref 20–33)
CREAT SERPL-MCNC: 0.84 MG/DL (ref 0.5–1.4)
GFR SERPLBLD CREATININE-BSD FMLA CKD-EPI: 101 ML/MIN/1.73 M 2
GLOBULIN SER CALC-MCNC: 2.6 G/DL (ref 1.9–3.5)
GLUCOSE SERPL-MCNC: 104 MG/DL (ref 65–99)
HDLC SERPL-MCNC: 53 MG/DL
LDLC SERPL CALC-MCNC: 138 MG/DL
POTASSIUM SERPL-SCNC: 4.1 MMOL/L (ref 3.6–5.5)
PROT SERPL-MCNC: 6.9 G/DL (ref 6–8.2)
SODIUM SERPL-SCNC: 136 MMOL/L (ref 135–145)
T3 SERPL-MCNC: 177 NG/DL (ref 60–181)
T4 FREE SERPL-MCNC: 1.1 NG/DL (ref 0.93–1.7)
THYROPEROXIDASE AB SERPL-ACNC: 11 IU/ML (ref 0–9)
TRIGL SERPL-MCNC: 89 MG/DL (ref 0–149)
TSH SERPL DL<=0.005 MIU/L-ACNC: 5.97 UIU/ML (ref 0.38–5.33)

## 2023-02-13 PROCEDURE — 84439 ASSAY OF FREE THYROXINE: CPT

## 2023-02-13 PROCEDURE — 84443 ASSAY THYROID STIM HORMONE: CPT

## 2023-02-13 PROCEDURE — 84442 ASSAY OF THYROID ACTIVITY: CPT

## 2023-02-13 PROCEDURE — 80061 LIPID PANEL: CPT

## 2023-02-13 PROCEDURE — 84480 ASSAY TRIIODOTHYRONINE (T3): CPT

## 2023-02-13 PROCEDURE — 86800 THYROGLOBULIN ANTIBODY: CPT

## 2023-02-13 PROCEDURE — 36415 COLL VENOUS BLD VENIPUNCTURE: CPT

## 2023-02-13 PROCEDURE — 86376 MICROSOMAL ANTIBODY EACH: CPT

## 2023-02-13 PROCEDURE — 80053 COMPREHEN METABOLIC PANEL: CPT

## 2023-02-15 LAB
T4BG SERPL-MCNC: 33.6 UG/ML (ref 13–30)
THYROGLOB AB SERPL-ACNC: <0.9 IU/ML (ref 0–4)

## 2023-02-16 ENCOUNTER — OFFICE VISIT (OUTPATIENT)
Dept: MEDICAL GROUP | Facility: IMAGING CENTER | Age: 23
End: 2023-02-16
Payer: COMMERCIAL

## 2023-02-16 VITALS
RESPIRATION RATE: 18 BRPM | TEMPERATURE: 97.1 F | OXYGEN SATURATION: 97 % | BODY MASS INDEX: 33.24 KG/M2 | SYSTOLIC BLOOD PRESSURE: 106 MMHG | HEART RATE: 73 BPM | DIASTOLIC BLOOD PRESSURE: 60 MMHG | HEIGHT: 63 IN | WEIGHT: 187.6 LBS

## 2023-02-16 DIAGNOSIS — Z23 NEED FOR VACCINATION: ICD-10-CM

## 2023-02-16 DIAGNOSIS — L70.0 ACNE VULGARIS: ICD-10-CM

## 2023-02-16 DIAGNOSIS — R79.89 ELEVATED TSH: ICD-10-CM

## 2023-02-16 DIAGNOSIS — F32.A ANXIETY AND DEPRESSION: ICD-10-CM

## 2023-02-16 DIAGNOSIS — N92.6 IRREGULAR PERIODS: ICD-10-CM

## 2023-02-16 DIAGNOSIS — F41.9 ANXIETY AND DEPRESSION: ICD-10-CM

## 2023-02-16 DIAGNOSIS — R63.5 WEIGHT GAIN: ICD-10-CM

## 2023-02-16 DIAGNOSIS — E07.9 THYROID CONDITION: ICD-10-CM

## 2023-02-16 DIAGNOSIS — E78.00 HYPERCHOLESTEROLEMIA: ICD-10-CM

## 2023-02-16 PROCEDURE — 90471 IMMUNIZATION ADMIN: CPT | Performed by: CLINICAL NURSE SPECIALIST

## 2023-02-16 PROCEDURE — 99215 OFFICE O/P EST HI 40 MIN: CPT | Mod: 25 | Performed by: CLINICAL NURSE SPECIALIST

## 2023-02-16 PROCEDURE — 90651 9VHPV VACCINE 2/3 DOSE IM: CPT | Performed by: CLINICAL NURSE SPECIALIST

## 2023-02-16 PROCEDURE — 90472 IMMUNIZATION ADMIN EACH ADD: CPT | Performed by: CLINICAL NURSE SPECIALIST

## 2023-02-16 PROCEDURE — 90686 IIV4 VACC NO PRSV 0.5 ML IM: CPT | Performed by: CLINICAL NURSE SPECIALIST

## 2023-02-16 ASSESSMENT — ANXIETY QUESTIONNAIRES
4. TROUBLE RELAXING: NEARLY EVERY DAY
1. FEELING NERVOUS, ANXIOUS, OR ON EDGE: MORE THAN HALF THE DAYS
5. BEING SO RESTLESS THAT IT IS HARD TO SIT STILL: NOT AT ALL
7. FEELING AFRAID AS IF SOMETHING AWFUL MIGHT HAPPEN: MORE THAN HALF THE DAYS
3. WORRYING TOO MUCH ABOUT DIFFERENT THINGS: NEARLY EVERY DAY
2. NOT BEING ABLE TO STOP OR CONTROL WORRYING: MORE THAN HALF THE DAYS
6. BECOMING EASILY ANNOYED OR IRRITABLE: NOT AT ALL
GAD7 TOTAL SCORE: 12

## 2023-02-16 ASSESSMENT — PATIENT HEALTH QUESTIONNAIRE - PHQ9
5. POOR APPETITE OR OVEREATING: 2 - MORE THAN HALF THE DAYS
CLINICAL INTERPRETATION OF PHQ2 SCORE: 5
SUM OF ALL RESPONSES TO PHQ QUESTIONS 1-9: 12

## 2023-02-16 ASSESSMENT — FIBROSIS 4 INDEX: FIB4 SCORE: 0.23

## 2023-02-16 ASSESSMENT — PAIN SCALES - GENERAL: PAINLEVEL: NO PAIN

## 2023-02-16 NOTE — PROGRESS NOTES
Subjective     Courtney Gayle is a 22 y.o. female who presents with Lab Results (From 02/13/23)            HPI  Anxiety and depression  Seeing a therapist.  Exercising more taking dog out 30-45 minutes a day.  Eggs, muffins, breakfast bar, fish, fruit, pasta, grilled cheese, snacks on sugar such as 1-2 oreos, meal preps for lunch, drinks mostly water, occasional alcohol, no soda, rare juice.  Trying to cut back on sodium.  She has gained weight despite these changes.  Skin is also breaking out. Anxiety and depression have not improved.      Hypercholesterolemia  Elevated LDL and total. HDL in healthy range. Hyperlipidemia runs in family.     Irregular periods  Skipping periods, gaining weight and has worsening acne.  Feels she has more body hair but has had this her whole life, no excess on face. No change in clitoris.     Thyroid condition  T3 normal but TSH slightly elevated with mild elevation in TPO antibodies and elevated thyroxine binding globulin.  She reports some weight gain and increased acne.      ROS  See HPI    No Known Allergies    Current Outpatient Medications on File Prior to Visit   Medication Sig Dispense Refill    vitamin D3 (CHOLECALCIFEROL) 5000 Unit (125 mcg) Tab       BLISOVI FE 1/20 1-20 MG-MCG per tablet TAKE 1 TABLET BY MOUTH EVERY DAY 84 Tablet 3     No current facility-administered medications on file prior to visit.         3/21/2022    12:30 PM 2/16/2023     8:40 AM   Depression Screen (PHQ-2/PHQ-9)   PHQ-2 Total Score 1 5   PHQ-9 Total Score 5 12       Interpretation of PHQ-9 Total Score   Score Severity   1-4 No Depression   5-9 Mild Depression   10-14 Moderate Depression   15-19 Moderately Severe Depression   20-27 Severe Depression    SEBASTIÁN-7 Questionnaire    Feeling nervous, anxious, or on edge: More than half the days  Not being able to sop or control worrying: More than half the days  Worrying too much about different things: Nearly every day  Trouble relaxing: Nearly every  "day  Being so restless that it's hard to sit still: Not at all  Becoming easily annoyed or irritable: Not at all  Feeling afraid as if something awful might happen: More than half the days  Total: 12    Interpretation of SEBASTIÁN 7 Total Score   Score Severity :  0-4 No Anxiety   5-9 Mild Anxiety  10-14 Moderate Anxiety  15-21 Severe Anxiety           Objective     /60 (BP Location: Left arm, Patient Position: Sitting, BP Cuff Size: Adult)   Pulse 73   Temp 36.2 °C (97.1 °F) (Temporal)   Resp 18   Ht 1.6 m (5' 3\")   Wt 85.1 kg (187 lb 9.6 oz)   LMP 12/24/2022 (Within Days)   SpO2 97%   BMI 33.23 kg/m²      Physical Exam  Constitutional:       General: She is not in acute distress.     Appearance: Normal appearance. She is not ill-appearing, toxic-appearing or diaphoretic.   HENT:      Head: Normocephalic and atraumatic.   Eyes:      General: No scleral icterus.     Extraocular Movements: Extraocular movements intact.      Pupils: Pupils are equal, round, and reactive to light.   Cardiovascular:      Rate and Rhythm: Normal rate.   Pulmonary:      Effort: Pulmonary effort is normal.   Skin:     General: Skin is warm and dry.   Neurological:      Mental Status: She is alert and oriented to person, place, and time.      Gait: Gait normal.   Psychiatric:         Mood and Affect: Mood normal. Affect is flat.         Behavior: Behavior normal.         Thought Content: Thought content normal.         Judgment: Judgment normal.                           Assessment & Plan      1. Anxiety and depression  Chronic, uncontrolled, worsening.  She reports her therapist recommended she start medication but she would like to rule out other biologic causes.  She has been on the birth control pill for many years.  We will stop this to see if there is any change.  She has also had some changes in her thyroid hormones.  We will recheck these in 1 to 2 months.  When she stopped the birth control pill we will check testosterone " and DHEA as she has also had some increase in acne.  Psychosocial support provided. Discussed medication management as an option if needed.     STOP birth control pill    2. Hypercholesterolemia  Chronic, new condition. Family history. Lipid panel is elevated.  The ASCVD Risk score (Miriam GIRALDO, et al., 2019) failed to calculate..  The patient was educated to increase fiber intake through either food or Metamucil and eat oatmeal multiple times a week without added sugar.   Work on food hacks such as cauliflower rice, apples and peanut butter with no sugar chocolate chips.  Exercise a minimum of 150 minutes a week with elevated heart rate.          3. Irregular periods  Chronic, uncontrolled.  Some sx of possible PCOS inc weight gain, acne, irregular menses, some slight increased back hair. No facial hair, voice change or clitoral enlargement per Courtney.  Will stop BCP and reassess in a couple of months. Will also get labs once pill stopped.     STOP BCP    - TESTOSTERONE F&T FEMALES/CHILD; Future  - DHEA SULFATE; Future    4. Acne vulgaris  Chronic, worsening. Check labs in a couple of months    - TESTOSTERONE F&T FEMALES/CHILD; Future  - DHEA SULFATE; Future    5. Elevated TSH  New issue. Recheck labs in 1-2 months. US thyroid ordered as slightly elevated TPO ab.     - TSH WITH REFLEX TO FT4; Future  - US-THYROID; Future    6. Need for vaccination  Dose 2 HPV.  - 9VHPV Vaccine 2-3 Dose (GARDASIL 9)  - INFLUENZA VACCINE QUAD INJ (PF)    7. Thyroid condition  New issue.  Repeat labs in 1-2 months and obtain thyroid u/s.      8. Weight gain    - CORTISOL - AM      Return in about 2 months (around 4/16/2023), or if symptoms worsen or fail to improve, for With test results.    My total time spent caring for the patient on the day of the encounter was 42 minutes.   This does not include time spent on separately billable procedures/tests.

## 2023-02-16 NOTE — ASSESSMENT & PLAN NOTE
Seeing a therapist.  Exercising more taking dog out 30-45 minutes a day.  Eggs, muffins, breakfast bar, fish, fruit, pasta, grilled cheese, snacks on sugar such as 1-2 oreos, meal preps for lunch, drinks mostly water, occasional alcohol, no soda, rare juice.  Trying to cut back on sodium.  She has gained weight despite these changes.  Skin is also breaking out. Anxiety and depression have not improved.

## 2023-02-16 NOTE — ASSESSMENT & PLAN NOTE
T3 normal but TSH slightly elevated with mild elevation in TPO antibodies and elevated thyroxine binding globulin.  She reports some weight gain and increased acne.

## 2023-02-16 NOTE — ASSESSMENT & PLAN NOTE
Skipping periods, gaining weight and has worsening acne.  Feels she has more body hair but has had this her whole life, no excess on face. No change in clitoris.

## 2023-03-10 ENCOUNTER — HOSPITAL ENCOUNTER (OUTPATIENT)
Dept: RADIOLOGY | Facility: MEDICAL CENTER | Age: 23
End: 2023-03-10
Attending: CLINICAL NURSE SPECIALIST
Payer: COMMERCIAL

## 2023-03-10 DIAGNOSIS — R79.89 ELEVATED TSH: ICD-10-CM

## 2023-03-10 PROCEDURE — 76536 US EXAM OF HEAD AND NECK: CPT

## 2024-03-01 ENCOUNTER — OFFICE VISIT (OUTPATIENT)
Dept: MEDICAL GROUP | Facility: IMAGING CENTER | Age: 24
End: 2024-03-01
Payer: COMMERCIAL

## 2024-03-01 VITALS
RESPIRATION RATE: 16 BRPM | HEART RATE: 70 BPM | SYSTOLIC BLOOD PRESSURE: 120 MMHG | HEIGHT: 64 IN | BODY MASS INDEX: 29.71 KG/M2 | OXYGEN SATURATION: 97 % | TEMPERATURE: 97 F | DIASTOLIC BLOOD PRESSURE: 68 MMHG | WEIGHT: 174 LBS

## 2024-03-01 DIAGNOSIS — Z30.09 FAMILY PLANNING: ICD-10-CM

## 2024-03-01 DIAGNOSIS — R79.89 ELEVATED TSH: ICD-10-CM

## 2024-03-01 DIAGNOSIS — F41.9 ANXIETY AND DEPRESSION: ICD-10-CM

## 2024-03-01 DIAGNOSIS — Z11.3 ROUTINE SCREENING FOR STI (SEXUALLY TRANSMITTED INFECTION): ICD-10-CM

## 2024-03-01 DIAGNOSIS — F32.A ANXIETY AND DEPRESSION: ICD-10-CM

## 2024-03-01 DIAGNOSIS — Z30.011 ENCOUNTER FOR INITIAL PRESCRIPTION OF CONTRACEPTIVE PILLS: ICD-10-CM

## 2024-03-01 DIAGNOSIS — E55.9 VITAMIN D DEFICIENCY: ICD-10-CM

## 2024-03-01 DIAGNOSIS — Z23 ENCOUNTER FOR ADMINISTRATION OF VACCINE: ICD-10-CM

## 2024-03-01 DIAGNOSIS — E78.00 HYPERCHOLESTEROLEMIA: ICD-10-CM

## 2024-03-01 DIAGNOSIS — E66.3 OVERWEIGHT (BMI 25.0-29.9): ICD-10-CM

## 2024-03-01 PROBLEM — R05.1 ACUTE COUGH: Status: RESOLVED | Noted: 2022-11-10 | Resolved: 2024-03-01

## 2024-03-01 PROBLEM — E07.9 THYROID CONDITION: Status: RESOLVED | Noted: 2023-02-16 | Resolved: 2024-03-01

## 2024-03-01 PROBLEM — Z20.5 EXPOSURE TO HEPATITIS B: Status: RESOLVED | Noted: 2022-03-21 | Resolved: 2024-03-01

## 2024-03-01 LAB
POCT INT CON NEG: NEGATIVE
POCT INT CON POS: POSITIVE
POCT URINE PREGNANCY TEST: NEGATIVE

## 2024-03-01 PROCEDURE — 3078F DIAST BP <80 MM HG: CPT | Performed by: STUDENT IN AN ORGANIZED HEALTH CARE EDUCATION/TRAINING PROGRAM

## 2024-03-01 PROCEDURE — 96127 BRIEF EMOTIONAL/BEHAV ASSMT: CPT | Performed by: STUDENT IN AN ORGANIZED HEALTH CARE EDUCATION/TRAINING PROGRAM

## 2024-03-01 PROCEDURE — 90471 IMMUNIZATION ADMIN: CPT | Performed by: STUDENT IN AN ORGANIZED HEALTH CARE EDUCATION/TRAINING PROGRAM

## 2024-03-01 PROCEDURE — 90715 TDAP VACCINE 7 YRS/> IM: CPT | Performed by: STUDENT IN AN ORGANIZED HEALTH CARE EDUCATION/TRAINING PROGRAM

## 2024-03-01 PROCEDURE — 99214 OFFICE O/P EST MOD 30 MIN: CPT | Mod: 25 | Performed by: STUDENT IN AN ORGANIZED HEALTH CARE EDUCATION/TRAINING PROGRAM

## 2024-03-01 PROCEDURE — 81025 URINE PREGNANCY TEST: CPT | Performed by: STUDENT IN AN ORGANIZED HEALTH CARE EDUCATION/TRAINING PROGRAM

## 2024-03-01 PROCEDURE — 3074F SYST BP LT 130 MM HG: CPT | Performed by: STUDENT IN AN ORGANIZED HEALTH CARE EDUCATION/TRAINING PROGRAM

## 2024-03-01 RX ORDER — SERTRALINE HYDROCHLORIDE 100 MG/1
100 TABLET, FILM COATED ORAL DAILY
COMMUNITY

## 2024-03-01 RX ORDER — HYDROXYZINE HYDROCHLORIDE 10 MG/1
10 TABLET, FILM COATED ORAL 3 TIMES DAILY PRN
COMMUNITY

## 2024-03-01 RX ORDER — NORETHINDRONE ACETATE AND ETHINYL ESTRADIOL 1.5-30(21)
1 KIT ORAL DAILY
Qty: 28 TABLET | Refills: 3 | Status: SHIPPED | OUTPATIENT
Start: 2024-03-01

## 2024-03-01 ASSESSMENT — ANXIETY QUESTIONNAIRES
6. BECOMING EASILY ANNOYED OR IRRITABLE: NOT AT ALL
3. WORRYING TOO MUCH ABOUT DIFFERENT THINGS: SEVERAL DAYS
1. FEELING NERVOUS, ANXIOUS, OR ON EDGE: SEVERAL DAYS
5. BEING SO RESTLESS THAT IT IS HARD TO SIT STILL: NOT AT ALL
4. TROUBLE RELAXING: SEVERAL DAYS
2. NOT BEING ABLE TO STOP OR CONTROL WORRYING: SEVERAL DAYS
GAD7 TOTAL SCORE: 4
7. FEELING AFRAID AS IF SOMETHING AWFUL MIGHT HAPPEN: NOT AT ALL

## 2024-03-01 ASSESSMENT — FIBROSIS 4 INDEX: FIB4 SCORE: 0.24

## 2024-03-01 ASSESSMENT — PATIENT HEALTH QUESTIONNAIRE - PHQ9: CLINICAL INTERPRETATION OF PHQ2 SCORE: 2

## 2024-03-01 NOTE — PROGRESS NOTES
Subjective:       CC:   Chief Complaint   Patient presents with    Establish Care       HPI:   Courtney is a 23 y.o. female is new to me and is here today to establish care. Previous PCP was Jose ROLDAN. Specialists:none. Pt would like to discuss the following today:      Birth control: was on Blisovi FE OCP but stopped because she developed irregular periods. Last use was a year ago. Periods have been more regular.  She would like to try a new OCP for now; but ultimately she would like to get Nexplanon for birth control.    Past medical history remarkable for:    Depression/anxiety: Chronic. States her symptoms are  stable with meds.  She is established with psychiatrist Catalina Stein and has a therapist at [facility].     SEBASTIÁN-7 Questionnaire    Feeling nervous, anxious, or on edge: Several days  Not being able to sop or control worrying: Several days  Worrying too much about different things: Several days  Trouble relaxing: Several days  Being so restless that it's hard to sit still: Not at all  Becoming easily annoyed or irritable: Not at all  Feeling afraid as if something awful might happen: Not at all  Total: 4    Interpretation of SEBASTIÁN 7 Total Score   Score Severity :  0-4 No Anxiety   5-9 Mild Anxiety  10-14 Moderate Anxiety  15-21 Severe Anxiety     Depression Screening    Little interest or pleasure in doing things?  1 - several days   Feeling down, depressed , or hopeless? 1 - several days   Trouble falling or staying asleep, or sleeping too much?      Feeling tired or having little energy?      Poor appetite or overeating?      Feeling bad about yourself - or that you are a failure or have let yourself or your family down?     Trouble concentrating on things, such as reading the newspaper or watching television?     Moving or speaking so slowly that other people could have noticed.  Or the opposite - being so fidgety or restless that you have been moving around a lot more than usual?       Thoughts that you would be better off dead, or of hurting yourself?      Patient Health Questionnaire Score:         If depressive symptoms identified deferred to follow up visit unless specifically addressed in assesment and plan.    Interpretation of PHQ-9 Total Score   Score Severity   1-4 No Depression   5-9 Mild Depression   10-14 Moderate Depression   15-19 Moderately Severe Depression   20-27 Severe Depression     Abnormal thyroid: chronic. Last TSH 5.970 2/2023. States her hands and feet are always cold and feels very tired. Denies changes in hair, nails, and skin. She has been doing lifestyle changes to lose weight. She has been losing weight but is easy to gain weight back.     Health Maintenance/Immunizations: UTD with pap smear. Declined Men B immunization, otherwise she is up-to-date with immunizations.    ROS:   See HPI    Patient Active Problem List    Diagnosis Date Noted    Overweight (BMI 25.0-29.9) 03/01/2024    Hypercholesterolemia 02/16/2023    Elevated TSH 07/07/2022    Seasonal allergies 04/04/2022    Vitamin D deficiency 04/04/2022    Anxiety and depression 03/21/2022    Irregular periods 03/21/2022       History reviewed. No pertinent past medical history.    Current Outpatient Medications   Medication Sig Dispense Refill    sertraline (ZOLOFT) 100 MG Tab Take 100 mg by mouth every day.      hydrOXYzine HCl (ATARAX) 10 MG Tab Take 10 mg by mouth 3 times a day as needed.      norethindrone-ethinyl estradiol-iron (LOESTRIN FE 1.5/30) 1.5-30 MG-MCG tablet Take 1 Tablet by mouth every day. 28 Tablet 3    vitamin D3 (CHOLECALCIFEROL) 5000 Unit (125 mcg) Tab        No current facility-administered medications for this visit.       Allergies as of 03/01/2024    (No Known Allergies)       Social History     Socioeconomic History    Marital status: Single     Spouse name: Not on file    Number of children: Not on file    Years of education: Not on file    Highest education level: 12th grade    Occupational History    Not on file   Tobacco Use    Smoking status: Never    Smokeless tobacco: Never   Vaping Use    Vaping Use: Never used   Substance and Sexual Activity    Alcohol use: Yes     Comment: Occasional    Drug use: Not Currently    Sexual activity: Not Currently   Other Topics Concern    Not on file   Social History Narrative    Not on file     Social Determinants of Health     Financial Resource Strain: Low Risk  (3/21/2022)    Overall Financial Resource Strain (CARDIA)     Difficulty of Paying Living Expenses: Not very hard   Food Insecurity: No Food Insecurity (3/21/2022)    Hunger Vital Sign     Worried About Running Out of Food in the Last Year: Never true     Ran Out of Food in the Last Year: Never true   Transportation Needs: No Transportation Needs (3/21/2022)    PRAPARE - Transportation     Lack of Transportation (Medical): No     Lack of Transportation (Non-Medical): No   Physical Activity: Insufficiently Active (3/21/2022)    Exercise Vital Sign     Days of Exercise per Week: 2 days     Minutes of Exercise per Session: 40 min   Stress: Stress Concern Present (3/21/2022)    [employer] - Occupational Stress Questionnaire     Feeling of Stress : Rather much   Social Connections: Socially Isolated (3/21/2022)    Social Connection and Isolation Panel [NHANES]     Frequency of Communication with Friends and Family: More than three times a week     Frequency of Social Gatherings with Friends and Family: Once a week     Attends Anabaptist Services: Never     Active Member of Clubs or Organizations: No     Attends Club or Organization Meetings: Never     Marital Status: Never    Intimate Partner Violence: Not on file   Housing Stability: Low Risk  (3/21/2022)    Housing Stability Vital Sign     Unable to Pay for Housing in the Last Year: No     Number of Places Lived in the Last Year: 1     Unstable Housing in the Last Year: No       History reviewed. No  "pertinent family history.    History reviewed. No pertinent surgical history.        Objective:     /68 (BP Location: Left arm, Patient Position: Sitting, BP Cuff Size: Adult)   Pulse 70   Temp 36.1 °C (97 °F) (Temporal)   Resp 16   Ht 1.626 m (5' 4\")   Wt 78.9 kg (174 lb)   LMP 02/16/2024   SpO2 97%   BMI 29.87 kg/m²     Physical Exam  Constitutional:       General: She is not in acute distress.     Appearance: Normal appearance.   HENT:      Head: Normocephalic and atraumatic.   Eyes:      General: No scleral icterus.  Neck:      Thyroid: No thyroid mass or thyromegaly.   Cardiovascular:      Rate and Rhythm: Normal rate and regular rhythm.      Heart sounds: Normal heart sounds. No murmur heard.  Pulmonary:      Effort: Pulmonary effort is normal.      Breath sounds: Normal breath sounds. No wheezing.   Abdominal:      General: Bowel sounds are normal. There is no distension.      Palpations: Abdomen is soft. There is no mass.      Tenderness: There is no abdominal tenderness.   Musculoskeletal:         General: No swelling. Normal range of motion.      Cervical back: Neck supple.   Skin:     General: Skin is warm and dry.   Neurological:      General: No focal deficit present.      Mental Status: She is alert and oriented to person, place, and time.      Gait: Gait normal.   Psychiatric:         Mood and Affect: Mood normal.         Behavior: Behavior normal.         Thought Content: Thought content normal.         Judgment: Judgment normal.            Assessment and Plan:     1. Encounter for initial prescription of contraceptive pills  Patient is requesting temporary OCP for birth control.  She would like to get Nexplanon.  Will trial Loestrin FE. Advised patient to take medication daily at the same time and to use backup for the next 2 weeks.  Denies history of clots. Denies smoking and vaping.  - norethindrone-ethinyl estradiol-iron (LOESTRIN FE 1.5/30) 1.5-30 MG-MCG tablet; Take 1 Tablet by " mouth every day.  Dispense: 28 Tablet; Refill: 3  - POCT Pregnancy   03/01/24 11:00   POC Urine Pregnancy Test Negative       2. Family planning  Referred to gynecology for Nexplanon.  - Referral to Gynecology    3. Elevated TSH  Chronic.  Will repeat TSH and thyroid antibodies.  - THYROID PEROXIDASE  (TPO) AB; Future  - THYROXINE BINDING GLOBULIN; Future  - HEMOGLOBIN A1C; Future  - TSH WITH REFLEX TO FT4; Future  - Comp Metabolic Panel; Future  - CBC WITH DIFFERENTIAL; Future  - Lipid Profile; Future    4. Hypercholesterolemia  Chronic.  Will recheck lipid panel along with other screening labs as listed below.  - HEMOGLOBIN A1C; Future  - TSH WITH REFLEX TO FT4; Future  - Comp Metabolic Panel; Future  - CBC WITH DIFFERENTIAL; Future  - Lipid Profile; Future    5. Vitamin D deficiency  Chronic.  Will continue with vitamin D3 5000 units daily.  Will check vitamin D.  - VITAMIN D 25-HYDROXY    6. Overweight (BMI 25.0-29.9)  Chronic.  Recommend healthy diet and physical activity.  Screening labs as listed below ordered. Hep B and C were negative in 2022, does not wish to repeat hepatitis testing.   - HEMOGLOBIN A1C; Future  - TSH WITH REFLEX TO FT4; Future  - Comp Metabolic Panel; Future  - CBC WITH DIFFERENTIAL; Future  - Lipid Profile; Future    7. Routine screening for STI (sexually transmitted infection)  STI screening ordered as requested by patient.  - HIV AG/AB COMBO ASSAY SCREENING; Future  - Chlamydia/GC, PCR (Urine); Future  - T.Pallidum AB JACOB (Screening); Future    8. Encounter for administration of vaccine  Received Tdap today.  Tolerated well.  - Tdap =>6yo IM    9. Anxiety and depression  Chronic and stable.  Will continue with sertraline 100 mg daily and hydroxyzine 10 mg 3 times daily as needed.  Will follow-up with psychiatrist and therapist as planned.      Diagnosis and treatment plan explained to pt. Counseled pt on new medication(s) and potential side effects. Pt agreed with treatment plan and  verbalized understanding.       Return in about 4 weeks (around 3/29/2024) for Annual Wellness Visit, Lab Results.     Please note that this dictation was created using voice recognition software. I have made every reasonable attempt to correct obvious errors, but I expect that there are errors of grammar and possibly content that I did not discover before finalizing the note.    Ani Siddiqui PA-C  [facility]

## 2024-03-01 NOTE — PATIENT INSTRUCTIONS
Thank you for choosing [facility]. It was a pleasure meeting you today.     Take care!  [facility]

## 2024-04-12 ENCOUNTER — HOSPITAL ENCOUNTER (OUTPATIENT)
Facility: MEDICAL CENTER | Age: 24
End: 2024-04-12
Attending: STUDENT IN AN ORGANIZED HEALTH CARE EDUCATION/TRAINING PROGRAM
Payer: COMMERCIAL

## 2024-04-12 ENCOUNTER — OFFICE VISIT (OUTPATIENT)
Dept: MEDICAL GROUP | Facility: IMAGING CENTER | Age: 24
End: 2024-04-12
Payer: COMMERCIAL

## 2024-04-12 VITALS
RESPIRATION RATE: 16 BRPM | TEMPERATURE: 97.4 F | DIASTOLIC BLOOD PRESSURE: 66 MMHG | SYSTOLIC BLOOD PRESSURE: 112 MMHG | WEIGHT: 174 LBS | HEIGHT: 64 IN | HEART RATE: 71 BPM | OXYGEN SATURATION: 97 % | BODY MASS INDEX: 29.71 KG/M2

## 2024-04-12 DIAGNOSIS — J30.2 SEASONAL ALLERGIES: ICD-10-CM

## 2024-04-12 DIAGNOSIS — F32.A ANXIETY AND DEPRESSION: ICD-10-CM

## 2024-04-12 DIAGNOSIS — E78.00 HYPERCHOLESTEROLEMIA: ICD-10-CM

## 2024-04-12 DIAGNOSIS — F41.9 ANXIETY AND DEPRESSION: ICD-10-CM

## 2024-04-12 DIAGNOSIS — E66.3 OVERWEIGHT (BMI 25.0-29.9): ICD-10-CM

## 2024-04-12 DIAGNOSIS — Z01.419 ENCOUNTER FOR GYNECOLOGICAL EXAMINATION (GENERAL) (ROUTINE) WITHOUT ABNORMAL FINDINGS: ICD-10-CM

## 2024-04-12 PROCEDURE — 88142 CYTOPATH C/V THIN LAYER: CPT

## 2024-04-12 PROCEDURE — 3074F SYST BP LT 130 MM HG: CPT | Performed by: STUDENT IN AN ORGANIZED HEALTH CARE EDUCATION/TRAINING PROGRAM

## 2024-04-12 PROCEDURE — 99395 PREV VISIT EST AGE 18-39: CPT | Performed by: STUDENT IN AN ORGANIZED HEALTH CARE EDUCATION/TRAINING PROGRAM

## 2024-04-12 PROCEDURE — 3078F DIAST BP <80 MM HG: CPT | Performed by: STUDENT IN AN ORGANIZED HEALTH CARE EDUCATION/TRAINING PROGRAM

## 2024-04-12 SDOH — ECONOMIC STABILITY: FOOD INSECURITY: WITHIN THE PAST 12 MONTHS, THE FOOD YOU BOUGHT JUST DIDN'T LAST AND YOU DIDN'T HAVE MONEY TO GET MORE.: NEVER TRUE

## 2024-04-12 SDOH — HEALTH STABILITY: PHYSICAL HEALTH: ON AVERAGE, HOW MANY MINUTES DO YOU ENGAGE IN EXERCISE AT THIS LEVEL?: 60 MIN

## 2024-04-12 SDOH — ECONOMIC STABILITY: HOUSING INSECURITY: IN THE LAST 12 MONTHS, HOW MANY PLACES HAVE YOU LIVED?: 1

## 2024-04-12 SDOH — ECONOMIC STABILITY: INCOME INSECURITY: HOW HARD IS IT FOR YOU TO PAY FOR THE VERY BASICS LIKE FOOD, HOUSING, MEDICAL CARE, AND HEATING?: NOT VERY HARD

## 2024-04-12 SDOH — ECONOMIC STABILITY: INCOME INSECURITY: IN THE LAST 12 MONTHS, WAS THERE A TIME WHEN YOU WERE NOT ABLE TO PAY THE MORTGAGE OR RENT ON TIME?: NO

## 2024-04-12 SDOH — HEALTH STABILITY: PHYSICAL HEALTH: ON AVERAGE, HOW MANY DAYS PER WEEK DO YOU ENGAGE IN MODERATE TO STRENUOUS EXERCISE (LIKE A BRISK WALK)?: 1 DAY

## 2024-04-12 SDOH — HEALTH STABILITY: MENTAL HEALTH
STRESS IS WHEN SOMEONE FEELS TENSE, NERVOUS, ANXIOUS, OR CAN'T SLEEP AT NIGHT BECAUSE THEIR MIND IS TROUBLED. HOW STRESSED ARE YOU?: ONLY A LITTLE

## 2024-04-12 SDOH — ECONOMIC STABILITY: FOOD INSECURITY: WITHIN THE PAST 12 MONTHS, YOU WORRIED THAT YOUR FOOD WOULD RUN OUT BEFORE YOU GOT MONEY TO BUY MORE.: NEVER TRUE

## 2024-04-12 ASSESSMENT — LIFESTYLE VARIABLES
HOW OFTEN DO YOU HAVE A DRINK CONTAINING ALCOHOL: MONTHLY OR LESS
AUDIT-C TOTAL SCORE: 1
HOW OFTEN DO YOU HAVE SIX OR MORE DRINKS ON ONE OCCASION: NEVER
HOW MANY STANDARD DRINKS CONTAINING ALCOHOL DO YOU HAVE ON A TYPICAL DAY: 1 OR 2
SKIP TO QUESTIONS 9-10: 1

## 2024-04-12 ASSESSMENT — SOCIAL DETERMINANTS OF HEALTH (SDOH)
HOW OFTEN DO YOU HAVE A DRINK CONTAINING ALCOHOL: MONTHLY OR LESS
HOW MANY DRINKS CONTAINING ALCOHOL DO YOU HAVE ON A TYPICAL DAY WHEN YOU ARE DRINKING: 1 OR 2
HOW OFTEN DO YOU HAVE SIX OR MORE DRINKS ON ONE OCCASION: NEVER
HOW HARD IS IT FOR YOU TO PAY FOR THE VERY BASICS LIKE FOOD, HOUSING, MEDICAL CARE, AND HEATING?: NOT VERY HARD
DO YOU BELONG TO ANY CLUBS OR ORGANIZATIONS SUCH AS CHURCH GROUPS UNIONS, FRATERNAL OR ATHLETIC GROUPS, OR SCHOOL GROUPS?: NO
HOW OFTEN DO YOU ATTEND CHURCH OR RELIGIOUS SERVICES?: NEVER
WITHIN THE PAST 12 MONTHS, YOU WORRIED THAT YOUR FOOD WOULD RUN OUT BEFORE YOU GOT THE MONEY TO BUY MORE: NEVER TRUE
DO YOU BELONG TO ANY CLUBS OR ORGANIZATIONS SUCH AS CHURCH GROUPS UNIONS, FRATERNAL OR ATHLETIC GROUPS, OR SCHOOL GROUPS?: NO
IN A TYPICAL WEEK, HOW MANY TIMES DO YOU TALK ON THE PHONE WITH FAMILY, FRIENDS, OR NEIGHBORS?: MORE THAN THREE TIMES A WEEK
HOW OFTEN DO YOU ATTEND CHURCH OR RELIGIOUS SERVICES?: NEVER
ARE YOU MARRIED, WIDOWED, DIVORCED, SEPARATED, NEVER MARRIED, OR LIVING WITH A PARTNER?: NEVER MARRIED
HOW OFTEN DO YOU ATTENT MEETINGS OF THE CLUB OR ORGANIZATION YOU BELONG TO?: NEVER
HOW OFTEN DO YOU GET TOGETHER WITH FRIENDS OR RELATIVES?: TWICE A WEEK
IN A TYPICAL WEEK, HOW MANY TIMES DO YOU TALK ON THE PHONE WITH FAMILY, FRIENDS, OR NEIGHBORS?: MORE THAN THREE TIMES A WEEK
HOW OFTEN DO YOU GET TOGETHER WITH FRIENDS OR RELATIVES?: TWICE A WEEK
ARE YOU MARRIED, WIDOWED, DIVORCED, SEPARATED, NEVER MARRIED, OR LIVING WITH A PARTNER?: NEVER MARRIED
HOW OFTEN DO YOU ATTENT MEETINGS OF THE CLUB OR ORGANIZATION YOU BELONG TO?: NEVER

## 2024-04-12 NOTE — PROGRESS NOTES
Subjective:     CC:   Chief Complaint   Patient presents with    Gynecologic Exam       HPI:   Courtney Gayle is a 23 y.o. female who presents for annual exam. She is feeling well and doesn't have concerns today.     Reports she has not started the new OCP. She's planning on starting OCP at the end of the month.     Anxiety/depression: Reports her symptoms are well-controlled with medication.    Seasonal allergies: Denies symptoms.     Ob-Gyn/ History:    Patient has GYN provider: no  /Para:    Last Pap Smear:  never.   Last mammogram:n/a  Gyn Surgery:  denies  Current Contraceptive Method:  condoms. Yes currently sexually active.  Last menstrual period:  3/25/24.  Per pt the last few menses have been more regular. Moderate bleeding. Cramping is mild.   She do not take OTC analgesics for cramps.  Folate intake: none  ROS: Denies bloating/fluid retention, pelvic pain, postcoital bleeding, or dyspareunia. No vaginal discharge      Health Maintenance  Cholesterol Screening: pending  Diabetes Screening: pending  Aspirin Use: n/a  Diet: per pt her diet is pretty good. Has been eating more protein.   Exercise: yes  Substance Abuse: denies  Safe in relationship:yes  Dental care: no  Seat belts, bike helmet, gun safety discussed.  Sun protection discussed.     Infectious disease screening/Immunizations  --STI Screening: pending  --Practices safe sex: yes  --HIV Screening: pending UTD  --Hepatitis C Screening: UTD  --Immunizations:    Influenza: out of season   HPV: UTD   Tetanus: UTD   Shingles: n/a   Pneumococcal:n/a             Other immunizations: declines Men B    She  has a past medical history of Anxiety and Hypertension.  She  has no past surgical history on file.    Family History   Problem Relation Age of Onset    Alcohol abuse Father     Alcohol abuse Maternal Grandfather     Psychiatric Illness Sister         Bipolar disorder II       Social History     Socioeconomic History    Marital status:  Single     Spouse name: Not on file    Number of children: Not on file    Years of education: Not on file    Highest education level: Bachelor's degree (e.g., BA, AB, BS)   Occupational History    Not on file   Tobacco Use    Smoking status: Never    Smokeless tobacco: Never   Vaping Use    Vaping Use: Never used   Substance and Sexual Activity    Alcohol use: Yes     Comment: Occasional    Drug use: Never    Sexual activity: Yes     Partners: Female, Male     Birth control/protection: Condom, Pill   Other Topics Concern    Not on file   Social History Narrative    Not on file     Social Determinants of Health     Financial Resource Strain: Low Risk  (4/12/2024)    Overall Financial Resource Strain (CARDIA)     Difficulty of Paying Living Expenses: Not very hard   Food Insecurity: No Food Insecurity (4/12/2024)    Hunger Vital Sign     Worried About Running Out of Food in the Last Year: Never true     Ran Out of Food in the Last Year: Never true   Transportation Needs: No Transportation Needs (4/12/2024)    PRAPARE - Transportation     Lack of Transportation (Medical): No     Lack of Transportation (Non-Medical): No   Physical Activity: Insufficiently Active (4/12/2024)    Exercise Vital Sign     Days of Exercise per Week: 1 day     Minutes of Exercise per Session: 60 min   Stress: No Stress Concern Present (4/12/2024)    [employer] [place] [employer] - Occupational Stress Questionnaire     Feeling of Stress : Only a little   Social Connections: Socially Isolated (4/12/2024)    Social Connection and Isolation Panel [NHANES]     Frequency of Communication with Friends and Family: More than three times a week     Frequency of Social Gatherings with Friends and Family: Twice a week     Attends Islam Services: Never     Active Member of Clubs or Organizations: No     Attends Club or Organization Meetings: Never     Marital Status: Never    Intimate Partner Violence: Not on file   Housing Stability:  "Low Risk  (4/12/2024)    Housing Stability Vital Sign     Unable to Pay for Housing in the Last Year: No     Number of Places Lived in the Last Year: 1     Unstable Housing in the Last Year: No       Patient Active Problem List    Diagnosis Date Noted    Overweight (BMI 25.0-29.9) 03/01/2024    Hypercholesterolemia 02/16/2023    Elevated TSH 07/07/2022    Seasonal allergies 04/04/2022    Vitamin D deficiency 04/04/2022    Anxiety and depression 03/21/2022    Irregular periods 03/21/2022         Current Outpatient Medications   Medication Sig Dispense Refill    sertraline (ZOLOFT) 100 MG Tab Take 100 mg by mouth every day.      hydrOXYzine HCl (ATARAX) 10 MG Tab Take 10 mg by mouth 3 times a day as needed.      norethindrone-ethinyl estradiol-iron (LOESTRIN FE 1.5/30) 1.5-30 MG-MCG tablet Take 1 Tablet by mouth every day. 28 Tablet 3    vitamin D3 (CHOLECALCIFEROL) 5000 Unit (125 mcg) Tab        No current facility-administered medications for this visit.     No Known Allergies      Objective:     /66 (BP Location: Left arm, Patient Position: Sitting, BP Cuff Size: Adult)   Pulse 71   Temp 36.3 °C (97.4 °F) (Temporal)   Resp 16   Ht 1.626 m (5' 4\")   Wt 78.9 kg (174 lb)   LMP 03/25/2024   SpO2 97%   BMI 29.87 kg/m²   Body mass index is 29.87 kg/m².  Wt Readings from Last 4 Encounters:   04/12/24 78.9 kg (174 lb)   03/01/24 78.9 kg (174 lb)   02/16/23 85.1 kg (187 lb 9.6 oz)   11/10/22 82.6 kg (182 lb 3.2 oz)       Physical Exam:  Constitutional: Well-developed and well-nourished. Not diaphoretic. No distress.   Skin: Skin is warm and dry. No rash noted.  Head: Atraumatic without lesions.  Eyes: No scleral icterus.   Nose: Nares patent.   Neck: Supple  Cardiovascular: Regular rate and rhythm, S1 and S2 without murmur, rubs, or gallops.  Lungs: Normal inspiratory effort, CTA bilaterally, no wheezes/rhonchi/rales  Abdomen: Soft, non tender, and without distention. Active bowel sounds in all four quadrants. " No rebound, guarding, masses or HSM.  :Perineum and external genitalia normal without rash or lesions. Vagina with no discharge. Small amount of menstrual bleeding present. Cervix without visible lesions or discharge. No cervical motion tenderness. Ovaries not palpated. Pap smear done today.   Extremities: No cyanosis, clubbing, erythema, nor edema. Distal pulses intact and symmetric.   Musculoskeletal: All major joints AROM full in all directions without pain.  Neurological: Alert and oriented x 3. Normal gait.   Psychiatric:  Behavior, mood, and affect are appropriate.    A chaperone was present during today's exam. Chaperone name: Margoth Santos was present.    Assessment and Plan:     1. Encounter for gynecological examination (general) (routine) without abnormal findings  Discussed safe and healthy lifestyle habits. Advised pt to eat healthy and to limit junk food, exercise for 30 min most days of the week, drink plenty of water, sleep for at least 8 hours at night, wear sunscreen when outdoors from 10 a.m. to 4 p.m, wear a seat belt while driving, safe sex practices, and see the dentist at least twice per year.   - THINPREP PAP ONLY; Future    2. Overweight (BMI 25.0-29.9)  Chronic and stable. Per pt she eats healthy and is exercising.  Encouraged patient to continue with healthy diet and exercise.  Reminded patient to complete screening labs ordered on previous visit.  Will follow-up after completing labs.    3. Anxiety and depression  Chronic and well-controlled.  Will continue with sertraline 100 mg daily and hydroxyzine 10 mg as needed.  Will follow-up with psychiatry and therapy as planned    4. Seasonal allergies  Chronic and well-controlled.  Denies symptoms.    5. Hypercholesterolemia  Chronic.  Unknown status.  Reminded patient to repeat lipid panel ordered on previous visit.  Will follow-up after completing labs.              Return in about 1 year (around 4/12/2025) for Annual Wellness Visit.      Please note that this dictation was created using voice recognition software. I have made every reasonable attempt to correct obvious errors, but I expect that there are errors of grammar and possibly content that I did not discover before finalizing the note.    Ani Siddiqui PA-C  [facility]

## 2024-04-15 ENCOUNTER — TELEPHONE (OUTPATIENT)
Dept: MEDICAL GROUP | Facility: IMAGING CENTER | Age: 24
End: 2024-04-15
Payer: COMMERCIAL

## 2024-04-15 NOTE — TELEPHONE ENCOUNTER
Lab called regarding the THINPREP PAP. Requesting for a recollect as the specimen was not labeled correctly.

## 2024-04-18 DIAGNOSIS — Z01.419 ENCOUNTER FOR GYNECOLOGICAL EXAMINATION (GENERAL) (ROUTINE) WITHOUT ABNORMAL FINDINGS: ICD-10-CM

## 2024-04-21 LAB
CYTOLOGIST CVX/VAG CYTO: NORMAL
CYTOLOGY CVX/VAG DOC CYTO: NORMAL
NOTE NL11727A: NORMAL
OTHER STN SPEC: NORMAL
STAT OF ADQ CVX/VAG CYTO-IMP: NORMAL

## 2024-05-24 DIAGNOSIS — Z30.011 ENCOUNTER FOR INITIAL PRESCRIPTION OF CONTRACEPTIVE PILLS: ICD-10-CM

## 2024-05-24 RX ORDER — NORETHINDRONE ACETATE AND ETHINYL ESTRADIOL AND FERROUS FUMARATE 1.5-30(21)
1 KIT ORAL
Qty: 84 TABLET | Refills: 1 | Status: SHIPPED | OUTPATIENT
Start: 2024-05-24

## 2024-05-24 NOTE — TELEPHONE ENCOUNTER
Received request via: Pharmacy    Was the patient seen in the last year in this department? Yes    Does the patient have an active prescription (recently filled or refills available) for medication(s) requested? No    Pharmacy Name: [facility]/Pharm[facility] #[id number] - [place], [place] - [address]     Does the patient have skilled nursing Plus and need 100 day supply (blood pressure, diabetes and cholesterol meds only)? Patient does not have SCP

## 2024-07-14 DIAGNOSIS — Z30.011 ENCOUNTER FOR INITIAL PRESCRIPTION OF CONTRACEPTIVE PILLS: ICD-10-CM

## 2024-07-15 RX ORDER — NORETHINDRONE ACETATE AND ETHINYL ESTRADIOL 1.5-30(21)
1 KIT ORAL
Qty: 84 TABLET | Refills: 1 | Status: SHIPPED | OUTPATIENT
Start: 2024-07-15

## 2024-09-01 ENCOUNTER — OFFICE VISIT (OUTPATIENT)
Dept: URGENT CARE | Facility: CLINIC | Age: 24
End: 2024-09-01
Payer: COMMERCIAL

## 2024-09-01 VITALS
WEIGHT: 185.4 LBS | DIASTOLIC BLOOD PRESSURE: 72 MMHG | OXYGEN SATURATION: 97 % | TEMPERATURE: 97.1 F | SYSTOLIC BLOOD PRESSURE: 92 MMHG | HEIGHT: 64 IN | RESPIRATION RATE: 20 BRPM | HEART RATE: 101 BPM | BODY MASS INDEX: 31.65 KG/M2

## 2024-09-01 DIAGNOSIS — R42 LIGHTHEADED: ICD-10-CM

## 2024-09-01 DIAGNOSIS — R00.0 TACHYCARDIA: ICD-10-CM

## 2024-09-01 PROCEDURE — 3078F DIAST BP <80 MM HG: CPT | Performed by: NURSE PRACTITIONER

## 2024-09-01 PROCEDURE — 3074F SYST BP LT 130 MM HG: CPT | Performed by: NURSE PRACTITIONER

## 2024-09-01 PROCEDURE — 99213 OFFICE O/P EST LOW 20 MIN: CPT | Performed by: NURSE PRACTITIONER

## 2024-09-01 RX ORDER — BUPROPION HYDROCHLORIDE 150 MG/1
150 TABLET, EXTENDED RELEASE ORAL DAILY
COMMUNITY

## 2024-09-01 ASSESSMENT — ENCOUNTER SYMPTOMS: DIZZINESS: 1

## 2024-09-02 NOTE — PROGRESS NOTES
Subjective:     Courtney Gayle is a 23 y.o. female who presents for Dizziness (Happened some time last month. The patient was prescribed a new medication and has been making her feel very lightheaded.)      Dizziness    Pt presents for evaluation of a new problem.  Courtney is a very pleasant 23-year-old female presents to urgent care today with complaints of ongoing  feelings of lightheadedness.  She states that her symptoms started shortly after being prescribed bupropion.  Her symptoms have only progressively worsened and she was advised by her psychiatrist to stop the medication 3 days ago.  She continues to have the same symptoms.  She denies any dizziness but does note that she feels as though she may pass out.  Associated symptoms include fatigue and intermittent headaches.  She denies any chest pain or shortness of breath.  Patient denies any chance of pregnancy.    Review of Systems   Neurological:  Positive for dizziness.       PMH:   Past Medical History:   Diagnosis Date    Anxiety     Hypertension      ALLERGIES: No Known Allergies  SURGHX: No past surgical history on file.  SOCHX:   Social History     Socioeconomic History    Marital status: Single    Highest education level: Bachelor's degree (e.g., BA, AB, BS)   Tobacco Use    Smoking status: Never    Smokeless tobacco: Never   Vaping Use    Vaping status: Never Used   Substance and Sexual Activity    Alcohol use: Yes     Comment: Occasional    Drug use: Never    Sexual activity: Yes     Partners: Female, Male     Birth control/protection: Condom, Pill     Social Determinants of Health     Financial Resource Strain: Low Risk  (4/12/2024)    Overall Financial Resource Strain (CARDIA)     Difficulty of Paying Living Expenses: Not very hard   Food Insecurity: No Food Insecurity (4/12/2024)    Hunger Vital Sign     Worried About Running Out of Food in the Last Year: Never true     Ran Out of Food in the Last Year: Never true   Transportation Needs:  "No Transportation Needs (4/12/2024)    PRAPARE - Transportation     Lack of Transportation (Medical): No     Lack of Transportation (Non-Medical): No   Physical Activity: Insufficiently Active (4/12/2024)    Exercise Vital Sign     Days of Exercise per Week: 1 day     Minutes of Exercise per Session: 60 min   Stress: No Stress Concern Present (4/12/2024)    [employer] [place] [employer] - Occupational Stress Questionnaire     Feeling of Stress : Only a little   Social Connections: Socially Isolated (4/12/2024)    Social Connection and Isolation Panel [NHANES]     Frequency of Communication with Friends and Family: More than three times a week     Frequency of Social Gatherings with Friends and Family: Twice a week     Attends Mandaeism Services: Never     Active Member of Clubs or Organizations: No     Attends Club or Organization Meetings: Never     Marital Status: Never    Housing Stability: Low Risk  (4/12/2024)    Housing Stability Vital Sign     Unable to Pay for Housing in the Last Year: No     Number of Places Lived in the Last Year: 1     Unstable Housing in the Last Year: No     FH:   Family History   Problem Relation Age of Onset    Alcohol abuse Father     Alcohol abuse Maternal Grandfather     Psychiatric Illness Sister         Bipolar disorder II         Objective:   BP 92/72 (BP Location: Left arm, Patient Position: Sitting)   Pulse (!) 101   Temp 36.2 °C (97.1 °F) (Temporal)   Resp 20   Ht 1.626 m (5' 4\")   Wt 84.1 kg (185 lb 6.4 oz)   SpO2 97%   BMI 31.82 kg/m²     Physical Exam  Vitals and nursing note reviewed.   Constitutional:       General: She is not in acute distress.     Appearance: Normal appearance. She is normal weight. She is not ill-appearing or toxic-appearing.   HENT:      Head: Normocephalic.      Right Ear: Tympanic membrane, ear canal and external ear normal.      Left Ear: Tympanic membrane, ear canal and external ear normal.      Nose: No congestion or " rhinorrhea.      Mouth/Throat:      Mouth: Mucous membranes are moist.      Pharynx: No oropharyngeal exudate or posterior oropharyngeal erythema.   Eyes:      General:         Right eye: No discharge.         Left eye: No discharge.      Pupils: Pupils are equal, round, and reactive to light.   Cardiovascular:      Rate and Rhythm: Normal rate and regular rhythm.      Pulses: Normal pulses.      Heart sounds: Normal heart sounds.   Pulmonary:      Effort: Pulmonary effort is normal. No respiratory distress.      Breath sounds: No stridor. No wheezing, rhonchi or rales.   Chest:      Chest wall: No tenderness.   Abdominal:      General: Abdomen is flat.   Musculoskeletal:         General: Normal range of motion.      Cervical back: Normal range of motion and neck supple.   Skin:     General: Skin is dry.   Neurological:      General: No focal deficit present.      Mental Status: She is alert and oriented to person, place, and time. Mental status is at baseline.   Psychiatric:         Mood and Affect: Mood normal.         Behavior: Behavior normal.         Thought Content: Thought content normal.         Judgment: Judgment normal.     ECG: Normal sinus rhythm rate of 86  Assessment/Plan:   Assessment      1. Lightheaded  EKG - Clinic Performed      2. Tachycardia          Differential diagnoses discussed with patient.  Exam in clinic today was benign.  I am able to order stat laboratory work as this is a holiday weekend and lab is closed until Tuesday.  We did discuss ordering labs for the future and she would like to hold off at this time.  Symptoms are likely related to new medication usage and discontinuation.  She does have a follow-up appointment with psychiatrist on Tuesday.  Patient to return to urgent care or ER for worsening symptoms.  She is in agreement with this plan of care.

## 2024-11-04 ENCOUNTER — OFFICE VISIT (OUTPATIENT)
Dept: MEDICAL GROUP | Facility: IMAGING CENTER | Age: 24
End: 2024-11-04
Payer: COMMERCIAL

## 2024-11-04 ENCOUNTER — HOSPITAL ENCOUNTER (OUTPATIENT)
Dept: LAB | Facility: MEDICAL CENTER | Age: 24
End: 2024-11-04
Attending: PHYSICIAN ASSISTANT
Payer: COMMERCIAL

## 2024-11-04 VITALS
DIASTOLIC BLOOD PRESSURE: 76 MMHG | HEIGHT: 64 IN | RESPIRATION RATE: 17 BRPM | HEART RATE: 91 BPM | TEMPERATURE: 97.8 F | BODY MASS INDEX: 31.76 KG/M2 | OXYGEN SATURATION: 98 % | WEIGHT: 186 LBS | SYSTOLIC BLOOD PRESSURE: 114 MMHG

## 2024-11-04 DIAGNOSIS — R19.7 DIARRHEA, UNSPECIFIED TYPE: ICD-10-CM

## 2024-11-04 LAB
ALBUMIN SERPL BCP-MCNC: 3.9 G/DL (ref 3.2–4.9)
ALBUMIN/GLOB SERPL: 1.3 G/DL
ALP SERPL-CCNC: 63 U/L (ref 30–99)
ALT SERPL-CCNC: 18 U/L (ref 2–50)
ANION GAP SERPL CALC-SCNC: 12 MMOL/L (ref 7–16)
AST SERPL-CCNC: 16 U/L (ref 12–45)
BASOPHILS # BLD AUTO: 1 % (ref 0–1.8)
BASOPHILS # BLD: 0.06 K/UL (ref 0–0.12)
BILIRUB SERPL-MCNC: 0.2 MG/DL (ref 0.1–1.5)
BUN SERPL-MCNC: 9 MG/DL (ref 8–22)
CALCIUM ALBUM COR SERPL-MCNC: 10 MG/DL (ref 8.5–10.5)
CALCIUM SERPL-MCNC: 9.9 MG/DL (ref 8.5–10.5)
CHLORIDE SERPL-SCNC: 104 MMOL/L (ref 96–112)
CO2 SERPL-SCNC: 24 MMOL/L (ref 20–33)
CREAT SERPL-MCNC: 0.75 MG/DL (ref 0.5–1.4)
EOSINOPHIL # BLD AUTO: 0.11 K/UL (ref 0–0.51)
EOSINOPHIL NFR BLD: 1.8 % (ref 0–6.9)
ERYTHROCYTE [DISTWIDTH] IN BLOOD BY AUTOMATED COUNT: 40.1 FL (ref 35.9–50)
GFR SERPLBLD CREATININE-BSD FMLA CKD-EPI: 115 ML/MIN/1.73 M 2
GLOBULIN SER CALC-MCNC: 3.1 G/DL (ref 1.9–3.5)
GLUCOSE SERPL-MCNC: 79 MG/DL (ref 65–99)
HCT VFR BLD AUTO: 42.8 % (ref 37–47)
HGB BLD-MCNC: 14.7 G/DL (ref 12–16)
IMM GRANULOCYTES # BLD AUTO: 0.01 K/UL (ref 0–0.11)
IMM GRANULOCYTES NFR BLD AUTO: 0.2 % (ref 0–0.9)
LIPASE SERPL-CCNC: 32 U/L (ref 11–82)
LYMPHOCYTES # BLD AUTO: 2.25 K/UL (ref 1–4.8)
LYMPHOCYTES NFR BLD: 37.4 % (ref 22–41)
MAGNESIUM SERPL-MCNC: 2 MG/DL (ref 1.5–2.5)
MCH RBC QN AUTO: 31.1 PG (ref 27–33)
MCHC RBC AUTO-ENTMCNC: 34.3 G/DL (ref 32.2–35.5)
MCV RBC AUTO: 90.5 FL (ref 81.4–97.8)
MONOCYTES # BLD AUTO: 0.66 K/UL (ref 0–0.85)
MONOCYTES NFR BLD AUTO: 11 % (ref 0–13.4)
NEUTROPHILS # BLD AUTO: 2.92 K/UL (ref 1.82–7.42)
NEUTROPHILS NFR BLD: 48.6 % (ref 44–72)
NRBC # BLD AUTO: 0 K/UL
NRBC BLD-RTO: 0 /100 WBC (ref 0–0.2)
PLATELET # BLD AUTO: 260 K/UL (ref 164–446)
PMV BLD AUTO: 9.2 FL (ref 9–12.9)
POTASSIUM SERPL-SCNC: 3.7 MMOL/L (ref 3.6–5.5)
PROT SERPL-MCNC: 7 G/DL (ref 6–8.2)
RBC # BLD AUTO: 4.73 M/UL (ref 4.2–5.4)
SODIUM SERPL-SCNC: 140 MMOL/L (ref 135–145)
WBC # BLD AUTO: 6 K/UL (ref 4.8–10.8)

## 2024-11-04 PROCEDURE — 3078F DIAST BP <80 MM HG: CPT | Performed by: PHYSICIAN ASSISTANT

## 2024-11-04 PROCEDURE — 83735 ASSAY OF MAGNESIUM: CPT

## 2024-11-04 PROCEDURE — 99213 OFFICE O/P EST LOW 20 MIN: CPT | Performed by: PHYSICIAN ASSISTANT

## 2024-11-04 PROCEDURE — 83690 ASSAY OF LIPASE: CPT

## 2024-11-04 PROCEDURE — 85025 COMPLETE CBC W/AUTO DIFF WBC: CPT

## 2024-11-04 PROCEDURE — 84443 ASSAY THYROID STIM HORMONE: CPT

## 2024-11-04 PROCEDURE — 1126F AMNT PAIN NOTED NONE PRSNT: CPT | Performed by: PHYSICIAN ASSISTANT

## 2024-11-04 PROCEDURE — 84439 ASSAY OF FREE THYROXINE: CPT

## 2024-11-04 PROCEDURE — 36415 COLL VENOUS BLD VENIPUNCTURE: CPT

## 2024-11-04 PROCEDURE — 3074F SYST BP LT 130 MM HG: CPT | Performed by: PHYSICIAN ASSISTANT

## 2024-11-04 PROCEDURE — 80053 COMPREHEN METABOLIC PANEL: CPT

## 2024-11-04 RX ORDER — BUPROPION HYDROCHLORIDE 150 MG/1
150 TABLET ORAL
COMMUNITY
Start: 2024-08-14

## 2024-11-04 RX ORDER — ESCITALOPRAM OXALATE 5 MG/1
5 TABLET ORAL DAILY
COMMUNITY
Start: 2024-06-18

## 2024-11-04 ASSESSMENT — PAIN SCALES - GENERAL: PAINLEVEL_OUTOF10: NO PAIN

## 2024-11-04 NOTE — PROGRESS NOTES
Subjective:     CC:   Chief Complaint   Patient presents with    GI Problem     Diarrhea, chills. Took OTC iodium. Stomach cramping with back pain x3days        HPI:   Courtney presents today to discuss:    Diarrhea  Pt admits to loose and watery BM x 4 days. Up to 10 BM per day. Now down to 4-5 episodes. Has associated cramping. Has been using imodium with relief. Recently traveled to [place] the week before via airplane. No nausea or vomiting. Mild chills initially, but no fever. No med or supplement changes.  No recent hospitalizations or antibiotics.    Past Medical History:   Diagnosis Date    Anxiety     Hypertension      Family History   Problem Relation Age of Onset    Alcohol abuse Father     Alcohol abuse Maternal Grandfather     Psychiatric Illness Sister         Bipolar disorder II     History reviewed. No pertinent surgical history.  Social History     Tobacco Use    Smoking status: Never    Smokeless tobacco: Never   Vaping Use    Vaping status: Never Used   Substance Use Topics    Alcohol use: Yes     Comment: Occasional    Drug use: Never     Social History     Social History Narrative    Not on file     Current Outpatient Medications Ordered in Epic   Medication Sig Dispense Refill    buPROPion (WELLBUTRIN XL) 150 MG XL tablet Take 150 mg by mouth every day.      escitalopram (LEXAPRO) 5 MG tablet Take 5 mg by mouth every day.      norethindrone-ethinyl estradiol-iron (MICROGESTIN FE 1.5/30) 1.5-30 MG-MCG tablet Take 1 Tablet by mouth every day. 84 Tablet 1    hydrOXYzine HCl (ATARAX) 10 MG Tab Take 10 mg by mouth 3 times a day as needed.       No current Epic-ordered facility-administered medications on file.     Patient has no known allergies.    PMH/PSH/FH/Social history reviewed.  Vaccinations discussed.  Previous records and labs reviewed. Discussed age appropriate anticipatory guidance.    ROS: see hpi  Gen: no fevers/chills  Pulm: no sob, no cough  CV: no chest pain, no palpitations, no edema  GI:  "no nausea/vomiting, no diarrhea  Skin: no rash    Objective:   Exam:  /76 (BP Location: Left arm, Patient Position: Sitting, BP Cuff Size: Adult)   Pulse 91   Temp 36.6 °C (97.8 °F) (Temporal)   Resp 17   Ht 1.626 m (5' 4\")   Wt 84.4 kg (186 lb)   LMP 10/28/2024 (Exact Date)   SpO2 98%   BMI 31.93 kg/m²    Body mass index is 31.93 kg/m².    Gen: Alert and oriented, No apparent distress.  HEENT: Head atraumatic, normocephalic. Pupils equal and round.  Neck: Neck is supple without lymphadenopathy.   Lungs: Normal effort, CTA bilaterally, no wheezes, rhonchi, or rales  CV: Regular rate and rhythm. No murmurs, rubs, or gallops.  ABD: +BS. Non-tender, non-distended. No rebound, rigidity, or guarding.  Ext: No clubbing, cyanosis, edema.    Assessment & Plan:     23 y.o. female with the following -     1. Diarrhea, unspecified type  Suspect viral etiology.  Symptoms overall improving.  May continue using Imodium as needed. Will check blood work below to assess electrolytes and replace appropriately.  Recommend bland diet, advance as tolerated.  Avoid spicy, acidic foods.  If diarrhea persists past 7 to 10 days complete stool testing below and will place GI referral.  ER if fever, abdominal pain, worsening diarrhea, dehydration, melena, hematochezia.  - CALPROTECTIN,FECAL; Future  - FECAL LACTOFERRIN QUALITATIVE; Future  - CULTURE STOOL; Future  - CRYPTO/GIARDIA RAPID ASSAY; Future  - CBC WITH DIFFERENTIAL; Future  - Comp Metabolic Panel; Future  - TSH WITH REFLEX TO FT4; Future  - LIPASE; Future  - MAGNESIUM; Future    Return if symptoms worsen or fail to improve.    Tabby Wade) Pj SIERRA  Physician Assistant Certified  [facility]      Please note that this dictation was created using voice recognition software. I have made every reasonable attempt to correct obvious errors, but I expect that there are errors of grammar and possibly content that I did not discover before finalizing the " note.

## 2024-11-04 NOTE — PATIENT INSTRUCTIONS
It was a pleasure meeting with you today at [facility]!    Your medical history/records and medications were reviewed today.     UPDATE on MyChart Results: If you have blood work, and/or imaging studies, or any other test or procedure completed, you will have access to results as soon as they become available in MyChart. Recently, these results will be available for review at the same time that your provider is able to see results!    This will likely mean you will see a result before your provider has had a chance to review and discuss with you.  Some results or care notes may be hard to understand and may be serious in nature.    We look at every result and your provider will contact you to explain what they mean and discuss appropriate next steps. Please allow for at least 72 business hours for chart and result review.     We prefer that you wait for your care team to contact you with your results.  Often, your provider will discuss your results with you at your next appointment. We look forward to continuing to partner with you in your care.    Please review my practice information below:    If you have any prescription refill requests, please send them via [facility] or discuss with your provider at the start of your office visits. Please allow 3-5 business days for lab and testing review and you will be contacted via [facility] with those results, or if advised to make a follow up appointment regarding those results, then please do so.     Once resulted, your lab/test/imaging results will show up automatically in your MyChart. Please wait for my interpretation and recommendations prior to viewing your results to avoid any unnecessary confusion or misinterpretation. I will address all of the lab values that I interpret as abnormal and message you accordingly on your MyChart. I will always send you a message about your results even if they are normal. If you do not hear back from me within 5-7 business  days after completing your tests, then please send me a message on [employer] so I can obtain your results (especially if you went to an outside lab or imaging center - [facility], [facility], etc).     If you have any additional questions or concerns beyond my interpretation of your results, please make an appointment with me to discuss in further detail.    Please only use the [employer] messaging system for questions regarding your most recent appointment or if advised to use otherwise (glucose or blood pressure reporting).     If you have any new problems or concerns, you must make an appointment to discuss. This includes any referral requests, lab requests (unless advised to notify me for pre-appt labs), medication side effects, or request for medication adjustments.     Please arrive 15 minutes prior to your appointment time to complete your check-in and intake with the medical assistant.      Thank you,    Tabby Oliva PA-C ([place])  Physician Assistant Certified  [facility]    -----------------------------------------------------------------    Attn: Patients of [facility]:    In an effort to continue to provide excellent and efficient care to our patients, it is vital that we continue to use our resources appropriately. With that, this is a reminder that [employer] is used for prescription refill requests, test results, virtual visits, and chart review only.     Any new questions, concerns/conditions, lab/imaging requests, medication adjustments, new prescriptions, or referral requests do require an appointment (virtually or in person), unless discussed otherwise at your most recent appointment.     Thank you for your understanding,    [facility]

## 2024-11-04 NOTE — ASSESSMENT & PLAN NOTE
Pt admits to loose and watery BM x 4 days. Up to 10 BM per day. Now down to 4-5 episodes. Has associated cramping. Has been using imodium with relief. Recently traveled to [place] the week before via airplane. No nausea or vomiting. Mild chills initially, but no fever. No med or supplement changes.  No recent hospitalizations or antibiotics.

## 2024-11-05 LAB
T4 FREE SERPL-MCNC: 1.15 NG/DL (ref 0.93–1.7)
TSH SERPL DL<=0.005 MIU/L-ACNC: 7.11 UIU/ML (ref 0.38–5.33)

## 2024-11-25 ENCOUNTER — HOSPITAL ENCOUNTER (OUTPATIENT)
Dept: LAB | Facility: MEDICAL CENTER | Age: 24
End: 2024-11-25
Attending: STUDENT IN AN ORGANIZED HEALTH CARE EDUCATION/TRAINING PROGRAM
Payer: COMMERCIAL

## 2024-11-25 DIAGNOSIS — R79.89 ELEVATED TSH: ICD-10-CM

## 2024-11-25 DIAGNOSIS — Z11.3 ROUTINE SCREENING FOR STI (SEXUALLY TRANSMITTED INFECTION): ICD-10-CM

## 2024-11-25 DIAGNOSIS — E78.00 HYPERCHOLESTEROLEMIA: ICD-10-CM

## 2024-11-25 DIAGNOSIS — E66.3 OVERWEIGHT (BMI 25.0-29.9): ICD-10-CM

## 2024-11-25 LAB
25(OH)D3 SERPL-MCNC: 51 NG/ML (ref 30–100)
ALBUMIN SERPL BCP-MCNC: 4.1 G/DL (ref 3.2–4.9)
ALBUMIN/GLOB SERPL: 1.6 G/DL
ALP SERPL-CCNC: 55 U/L (ref 30–99)
ALT SERPL-CCNC: 27 U/L (ref 2–50)
ANION GAP SERPL CALC-SCNC: 12 MMOL/L (ref 7–16)
AST SERPL-CCNC: 22 U/L (ref 12–45)
BASOPHILS # BLD AUTO: 0.8 % (ref 0–1.8)
BASOPHILS # BLD: 0.05 K/UL (ref 0–0.12)
BILIRUB SERPL-MCNC: 0.3 MG/DL (ref 0.1–1.5)
BUN SERPL-MCNC: 11 MG/DL (ref 8–22)
CALCIUM ALBUM COR SERPL-MCNC: 8.8 MG/DL (ref 8.5–10.5)
CALCIUM SERPL-MCNC: 8.9 MG/DL (ref 8.5–10.5)
CHLORIDE SERPL-SCNC: 106 MMOL/L (ref 96–112)
CHOLEST SERPL-MCNC: 198 MG/DL (ref 100–199)
CO2 SERPL-SCNC: 21 MMOL/L (ref 20–33)
CREAT SERPL-MCNC: 0.69 MG/DL (ref 0.5–1.4)
EOSINOPHIL # BLD AUTO: 0.06 K/UL (ref 0–0.51)
EOSINOPHIL NFR BLD: 0.9 % (ref 0–6.9)
ERYTHROCYTE [DISTWIDTH] IN BLOOD BY AUTOMATED COUNT: 41.2 FL (ref 35.9–50)
EST. AVERAGE GLUCOSE BLD GHB EST-MCNC: 108 MG/DL
GFR SERPLBLD CREATININE-BSD FMLA CKD-EPI: 125 ML/MIN/1.73 M 2
GLOBULIN SER CALC-MCNC: 2.5 G/DL (ref 1.9–3.5)
GLUCOSE SERPL-MCNC: 88 MG/DL (ref 65–99)
HBA1C MFR BLD: 5.4 % (ref 4–5.6)
HCT VFR BLD AUTO: 41.8 % (ref 37–47)
HDLC SERPL-MCNC: 43 MG/DL
HGB BLD-MCNC: 14.4 G/DL (ref 12–16)
HIV 1+2 AB+HIV1 P24 AG SERPL QL IA: NORMAL
IMM GRANULOCYTES # BLD AUTO: 0.01 K/UL (ref 0–0.11)
IMM GRANULOCYTES NFR BLD AUTO: 0.2 % (ref 0–0.9)
LDLC SERPL CALC-MCNC: 135 MG/DL
LYMPHOCYTES # BLD AUTO: 2.42 K/UL (ref 1–4.8)
LYMPHOCYTES NFR BLD: 36.7 % (ref 22–41)
MCH RBC QN AUTO: 30.5 PG (ref 27–33)
MCHC RBC AUTO-ENTMCNC: 34.4 G/DL (ref 32.2–35.5)
MCV RBC AUTO: 88.6 FL (ref 81.4–97.8)
MONOCYTES # BLD AUTO: 0.38 K/UL (ref 0–0.85)
MONOCYTES NFR BLD AUTO: 5.8 % (ref 0–13.4)
NEUTROPHILS # BLD AUTO: 3.68 K/UL (ref 1.82–7.42)
NEUTROPHILS NFR BLD: 55.6 % (ref 44–72)
NRBC # BLD AUTO: 0 K/UL
NRBC BLD-RTO: 0 /100 WBC (ref 0–0.2)
PLATELET # BLD AUTO: 296 K/UL (ref 164–446)
PMV BLD AUTO: 9.3 FL (ref 9–12.9)
POTASSIUM SERPL-SCNC: 4.3 MMOL/L (ref 3.6–5.5)
PROT SERPL-MCNC: 6.6 G/DL (ref 6–8.2)
RBC # BLD AUTO: 4.72 M/UL (ref 4.2–5.4)
SODIUM SERPL-SCNC: 139 MMOL/L (ref 135–145)
T PALLIDUM AB SER QL IA: NORMAL
THYROPEROXIDASE AB SERPL-ACNC: 24 IU/ML (ref 0–9)
TRIGL SERPL-MCNC: 100 MG/DL (ref 0–149)
TSH SERPL DL<=0.005 MIU/L-ACNC: 3.32 UIU/ML (ref 0.38–5.33)
WBC # BLD AUTO: 6.6 K/UL (ref 4.8–10.8)

## 2024-11-25 PROCEDURE — 80061 LIPID PANEL: CPT

## 2024-11-25 PROCEDURE — 80053 COMPREHEN METABOLIC PANEL: CPT

## 2024-11-25 PROCEDURE — 86780 TREPONEMA PALLIDUM: CPT

## 2024-11-25 PROCEDURE — 84442 ASSAY OF THYROID ACTIVITY: CPT

## 2024-11-25 PROCEDURE — 85025 COMPLETE CBC W/AUTO DIFF WBC: CPT

## 2024-11-25 PROCEDURE — 86376 MICROSOMAL ANTIBODY EACH: CPT

## 2024-11-25 PROCEDURE — 87491 CHLMYD TRACH DNA AMP PROBE: CPT

## 2024-11-25 PROCEDURE — 83036 HEMOGLOBIN GLYCOSYLATED A1C: CPT

## 2024-11-25 PROCEDURE — 87389 HIV-1 AG W/HIV-1&-2 AB AG IA: CPT

## 2024-11-25 PROCEDURE — 87591 N.GONORRHOEAE DNA AMP PROB: CPT

## 2024-11-25 PROCEDURE — 84443 ASSAY THYROID STIM HORMONE: CPT

## 2024-11-25 PROCEDURE — 82306 VITAMIN D 25 HYDROXY: CPT

## 2024-11-25 PROCEDURE — 36415 COLL VENOUS BLD VENIPUNCTURE: CPT

## 2024-11-26 PROBLEM — R79.89 ELEVATED TSH: Status: RESOLVED | Noted: 2022-07-07 | Resolved: 2024-11-26

## 2024-11-26 PROBLEM — R76.8 ANTI-TPO ANTIBODIES PRESENT: Status: ACTIVE | Noted: 2024-11-26

## 2024-11-26 LAB
C TRACH DNA SPEC QL NAA+PROBE: NEGATIVE
N GONORRHOEA DNA SPEC QL NAA+PROBE: NEGATIVE
SPECIMEN SOURCE: NORMAL

## 2024-11-27 LAB — T4BG SERPL-MCNC: 34.7 UG/ML (ref 13–30)

## 2024-12-02 ENCOUNTER — APPOINTMENT (OUTPATIENT)
Dept: MEDICAL GROUP | Facility: IMAGING CENTER | Age: 24
End: 2024-12-02
Payer: COMMERCIAL

## 2024-12-02 VITALS
HEART RATE: 90 BPM | OXYGEN SATURATION: 97 % | DIASTOLIC BLOOD PRESSURE: 60 MMHG | SYSTOLIC BLOOD PRESSURE: 108 MMHG | RESPIRATION RATE: 18 BRPM | TEMPERATURE: 97.9 F | WEIGHT: 190.2 LBS | HEIGHT: 64 IN | BODY MASS INDEX: 32.47 KG/M2

## 2024-12-02 DIAGNOSIS — E07.89: ICD-10-CM

## 2024-12-02 DIAGNOSIS — Z23 ENCOUNTER FOR ADMINISTRATION OF VACCINE: ICD-10-CM

## 2024-12-02 DIAGNOSIS — Z71.2 ENCOUNTER TO DISCUSS TEST RESULTS: ICD-10-CM

## 2024-12-02 DIAGNOSIS — R76.8 ANTI-TPO ANTIBODIES PRESENT: ICD-10-CM

## 2024-12-02 DIAGNOSIS — R55 SYNCOPE, UNSPECIFIED SYNCOPE TYPE: ICD-10-CM

## 2024-12-02 DIAGNOSIS — E78.5 DYSLIPIDEMIA: ICD-10-CM

## 2024-12-02 PROBLEM — E55.9 VITAMIN D DEFICIENCY: Status: RESOLVED | Noted: 2022-04-04 | Resolved: 2024-12-02

## 2024-12-02 LAB — GLUCOSE BLD-MCNC: 92 MG/DL (ref 65–99)

## 2024-12-02 PROCEDURE — 90656 IIV3 VACC NO PRSV 0.5 ML IM: CPT | Performed by: STUDENT IN AN ORGANIZED HEALTH CARE EDUCATION/TRAINING PROGRAM

## 2024-12-02 PROCEDURE — 3074F SYST BP LT 130 MM HG: CPT | Performed by: STUDENT IN AN ORGANIZED HEALTH CARE EDUCATION/TRAINING PROGRAM

## 2024-12-02 PROCEDURE — 90471 IMMUNIZATION ADMIN: CPT | Performed by: STUDENT IN AN ORGANIZED HEALTH CARE EDUCATION/TRAINING PROGRAM

## 2024-12-02 PROCEDURE — 99215 OFFICE O/P EST HI 40 MIN: CPT | Mod: 25 | Performed by: STUDENT IN AN ORGANIZED HEALTH CARE EDUCATION/TRAINING PROGRAM

## 2024-12-02 PROCEDURE — 82962 GLUCOSE BLOOD TEST: CPT | Performed by: STUDENT IN AN ORGANIZED HEALTH CARE EDUCATION/TRAINING PROGRAM

## 2024-12-02 PROCEDURE — 3078F DIAST BP <80 MM HG: CPT | Performed by: STUDENT IN AN ORGANIZED HEALTH CARE EDUCATION/TRAINING PROGRAM

## 2024-12-02 ASSESSMENT — FIBROSIS 4 INDEX: FIB4 SCORE: 0.31

## 2024-12-02 NOTE — PROGRESS NOTES
Subjective:     CC:   Chief Complaint   Patient presents with    Lab Results     11/25 labs       HPI:     Verbal consent was acquired by the patient to use [employer] ambient listening note generation during this visit Yes      Courtney Gayle sloane, 23 y.o., female,  presents today to discuss:     History of Present Illness  The patient presents for evaluation of:    Recent Laboratory Results  She is here to discuss her recent laboratory results.    Episodes of Diarrhea  Several weeks ago, she consulted with Tabby regarding episodes of diarrhea. Tabby recommended stool studies if the symptoms persisted; however, the diarrhea resolved spontaneously.  - Onset: Several weeks ago.  - Duration: Resolved spontaneously.  - Character: Episodes of diarrhea.  - Alleviating/Aggravating Factors: Resolved without intervention.    Weight Management and Fatigue  The patient has been actively managing her weight through dietary modifications, which have yielded some success. She reports experiencing fatigue, which she attributes to her hydroxyzine medication. She has discontinued the use of bupropion (Wellbutrin) and escitalopram (Lexapro).  - Onset: Ongoing weight management efforts.  - Character: Fatigue attributed to hydroxyzine.  - Alleviating/Aggravating Factors: Discontinued bupropion and escitalopram.    Menstrual Cycles and Supplements  Her menstrual cycles are regular, and she is currently using oral contraceptive pills. She also takes Women's One A Day gummy vitamins as a dietary supplement.    Cold Intolerance  The patient reports experiencing cold intolerance, even in environments with normal ambient temperatures.  - Character: Cold intolerance.  - Timing: Even in normal ambient temperatures.    FAMILY HISTORY  - Denies any family history of thyroid disease       ROS:  See HPI    Medications, allergies, past medical history, family history, surgical history, and social history documented in chart and  "reviewed by me.       Objective:   Exam:  /60 (BP Location: Right arm, Patient Position: Sitting, BP Cuff Size: Adult)   Pulse 90   Temp 36.6 °C (97.9 °F) (Temporal)   Resp 18   Ht 1.626 m (5' 4\")   Wt 86.3 kg (190 lb 3.2 oz)   LMP 11/25/2024 (Approximate)   SpO2 97%   BMI 32.65 kg/m²      Physical Exam  Constitutional:       General: She is not in acute distress.     Appearance: Normal appearance.   HENT:      Head: Normocephalic and atraumatic.   Eyes:      General: No scleral icterus.        Right eye: No discharge.         Left eye: No discharge.      Extraocular Movements: Extraocular movements intact.      Conjunctiva/sclera: Conjunctivae normal.      Pupils: Pupils are equal, round, and reactive to light.   Neck:      Thyroid: No thyroid mass or thyromegaly.      Vascular: No carotid bruit.   Cardiovascular:      Rate and Rhythm: Normal rate and regular rhythm.      Heart sounds: Normal heart sounds. No murmur heard.  Pulmonary:      Effort: Pulmonary effort is normal.      Breath sounds: Normal breath sounds. No wheezing.   Abdominal:      General: Bowel sounds are normal. There is no distension.      Palpations: Abdomen is soft. There is no mass.      Tenderness: There is no abdominal tenderness.   Musculoskeletal:         General: No swelling. Normal range of motion.      Cervical back: Normal range of motion and neck supple. No rigidity.   Lymphadenopathy:      Cervical: No cervical adenopathy.   Skin:     General: Skin is warm and dry.   Neurological:      General: No focal deficit present.      Mental Status: She is alert and oriented to person, place, and time.      Cranial Nerves: No cranial nerve deficit.      Sensory: No sensory deficit.      Motor: No weakness.      Gait: Gait normal.   Psychiatric:         Mood and Affect: Mood normal.         Behavior: Behavior normal.         Thought Content: Thought content normal.         Judgment: Judgment normal.              Assessment & Plan: "       Assessment & Plan    1. Anti-TPO antibodies present  2. Thyroxine binding globulin (TBG) excess  Chronic, uncontrolled. TSH and free T4 are normal.  Thyroid ultrasound ordered for further evaluation.  Will check free T3.  Referred to endocrinology for further evaluation and management.  - T3 FREE; Future  - US-THYROID; Future  - Referral to Endocrinology   Latest Reference Range & Units 02/13/23 09:22 11/25/24 13:21   Microsomal -Tpo- Abs 0.0 - 9.0 IU/mL 11.0 (H) 24.0 (H)   (H): Data is abnormally high   Latest Reference Range & Units 11/04/24 14:43   Free T-4 0.93 - 1.70 ng/dL 1.15        Latest Reference Range & Units 11/25/24 13:21   TSH 0.380 - 5.330 uIU/mL 3.320        Latest Reference Range & Units 02/13/23 09:22 11/25/24 13:21   Thyroxine Binding Globulin 13.0 - 30.0 ug/mL 33.6 (H) 34.7 (H)   (H): Data is abnormally high    3. Dyslipidemia  Chronic, improving.  Discussed lifestyle modifications to help lower cholesterol and to help prevent heart disease.  Recommend yearly checks or sooner if indicated.  I recommend the following to help lower your cholesterol:  Decrease intake of saturated fat. Sources of saturated fat include:  Processed meat, including hot dogs, sausage, ulloa and pepperoni.  Fatty cuts of meat, including ribs, poultry with the skin and highly marbled meat.  Full-fat dairy products, including butter, heavy cream, cream cheese and sour cream.  Coconut oil and palm oil.  Fried food.  2. Increase intake of poly-and monounsaturated fats (avocado, nuts, olive oil, soybean oil, corn oil, sunflower oil)  3. Increase intake of soluble fiber (oats, peas, beans, apples, citrus fruits, carrots, barley and psyllium)  4. Increase consumption of tree nuts  5. Increase intake of soy protein  6. Increase intake of omega-3 fatty acids from marine sources (fatty fish like salmon, flax seeds, anthony seeds, walnuts.)  7. Follow a Mediterranean diet (Olive oil is main dietary fat, limited amounts of red  meat, dairy products, eggs, and poultry; increased amounts of vegetables, whole grains, fish, and tree nuts).  8. Physical activity for at least 30 minutes most days of the week        Latest Reference Range & Units 02/13/23 09:22 11/25/24 13:21   Cholesterol,Tot 100 - 199 mg/dL 209 (H) 198   Triglycerides 0 - 149 mg/dL 89 100   HDL >=40 mg/dL 53 43   LDL <100 mg/dL 138 (H) 135 (H)   (H): Data is abnormally high    4. Encounter for administration of vaccine  - INFLUENZA VACCINE TRI INJ (PF)     5. Syncope, unspecified syncope type  Acute.  Unfortunately patient had a brief syncopal episode for 1 minute after receiving the influenza vaccine that was administered by the medical assistant.  Patient was feeling very weak and nauseous after the syncopal episode.  There were no injuries.  Patient did not experience confusion after the syncopal episode.  Medical assistant was advised to recheck vital signs and check blood sugar.  Vital signs remarkable for low temperature, otherwise normal vital signs.  Blood sugar was normal at 92.  Patient almost had a second syncopal episode after her blood sugar was checked.  Patient denies being afraid of needles or being afraid of blood.  Per patient she has had influenza vaccines in the past with no syncopal episodes or other adverse reactions.  Patient denies taking new medications today or using drugs.  Patient was looking very pale and experienced nausea for several minutes.  Patient denied chest pain, shortness of breath, confusion, or vision changes.  Patient declined going to the emergency room.  Oral fluids and snacks were provided to patient.  EKG was conducted and was sinus rhythm.  Patient called her boyfriend who came over to the clinic to take her home.  After several minutes all her symptoms resolved and her skin was no longer pale.  Physical exam remained normal.  Vital signs were normal.  Temperature normalized.  Therefore, patient felt safe going home today.  Advised  patient to rest today and avoid driving. Patient was also advised to call 911 if she experiences another syncopal episode or if she does not feel well.   Latest Reference Range & Units 12/02/24 02:30   Glucose - Accu-Ck 65 - 99 mg/dL 92     - EKG - Clinic Performed    6. Encounter to discuss test results  CBC, CMP, A1c, lipid panel, vitamin D, HIV, syphilis, thyroid antibodies, and TSH were reviewed today.           Pt agreed with treatment plan and verbalized understanding.     Return if symptoms worsen or fail to improve.     A total of 50 minutes was spent today reviewing chart, assessing and examining the patient, ordering tests, and documenting. None of which was spent performing separately billing procedures or ancillary services.     Please note that this dictation was created using voice recognition software. I have made every reasonable attempt to correct obvious errors, but I expect that there are errors of grammar and possibly content that I did not discover before finalizing the note.    Ani Siddiqui PA-C  [facility]

## 2024-12-16 DIAGNOSIS — Z30.011 ENCOUNTER FOR INITIAL PRESCRIPTION OF CONTRACEPTIVE PILLS: ICD-10-CM

## 2024-12-16 RX ORDER — NORETHINDRONE ACETATE AND ETHINYL ESTRADIOL 1.5-30(21)
1 KIT ORAL
Qty: 84 TABLET | Refills: 1 | Status: SHIPPED | OUTPATIENT
Start: 2024-12-16

## 2024-12-16 NOTE — TELEPHONE ENCOUNTER
Received request via: Patient    Was the patient seen in the last year in this department? Yes    Does the patient have an active prescription (recently filled or refills available) for medication(s) requested? No    Pharmacy Name: [id number]    Does the patient have MCFP Plus and need 100-day supply? (This applies to ALL medications) Patient does not have SCP

## 2025-01-28 ENCOUNTER — APPOINTMENT (OUTPATIENT)
Dept: RADIOLOGY | Facility: MEDICAL CENTER | Age: 25
End: 2025-01-28
Attending: STUDENT IN AN ORGANIZED HEALTH CARE EDUCATION/TRAINING PROGRAM
Payer: COMMERCIAL

## 2025-01-28 DIAGNOSIS — E07.89: ICD-10-CM

## 2025-01-28 DIAGNOSIS — R76.8 ANTI-TPO ANTIBODIES PRESENT: ICD-10-CM

## 2025-01-28 PROCEDURE — 76536 US EXAM OF HEAD AND NECK: CPT

## 2025-02-05 ENCOUNTER — TELEPHONE (OUTPATIENT)
Dept: ENDOCRINOLOGY | Facility: MEDICAL CENTER | Age: 25
End: 2025-02-05
Payer: COMMERCIAL

## 2025-02-05 NOTE — TELEPHONE ENCOUNTER
Pt called regarding referral to schedule as new pt in our office. Let pt know that we did have one of our providers review her referral and it has been redirected to the right department since our office does not see patients for TBG excess. Informed pt that she could follow up with her PCP. Pt understood

## 2025-02-06 DIAGNOSIS — R76.8 ANTI-TPO ANTIBODIES PRESENT: ICD-10-CM

## 2025-02-06 DIAGNOSIS — E07.89: ICD-10-CM

## 2025-02-08 ENCOUNTER — APPOINTMENT (OUTPATIENT)
Dept: RADIOLOGY | Facility: MEDICAL CENTER | Age: 25
DRG: 176 | End: 2025-02-08
Attending: EMERGENCY MEDICINE
Payer: COMMERCIAL

## 2025-02-08 ENCOUNTER — APPOINTMENT (OUTPATIENT)
Dept: RADIOLOGY | Facility: MEDICAL CENTER | Age: 25
DRG: 176 | End: 2025-02-08
Payer: COMMERCIAL

## 2025-02-08 ENCOUNTER — OFFICE VISIT (OUTPATIENT)
Dept: URGENT CARE | Facility: CLINIC | Age: 25
End: 2025-02-08
Payer: COMMERCIAL

## 2025-02-08 ENCOUNTER — HOSPITAL ENCOUNTER (INPATIENT)
Facility: MEDICAL CENTER | Age: 25
LOS: 2 days | DRG: 176 | End: 2025-02-11
Attending: EMERGENCY MEDICINE | Admitting: INTERNAL MEDICINE
Payer: COMMERCIAL

## 2025-02-08 VITALS
HEIGHT: 64 IN | DIASTOLIC BLOOD PRESSURE: 70 MMHG | BODY MASS INDEX: 32.42 KG/M2 | SYSTOLIC BLOOD PRESSURE: 118 MMHG | HEART RATE: 106 BPM | WEIGHT: 189.9 LBS | TEMPERATURE: 97.4 F | RESPIRATION RATE: 16 BRPM | OXYGEN SATURATION: 99 %

## 2025-02-08 DIAGNOSIS — R07.89 LEFT-SIDED CHEST WALL PAIN: ICD-10-CM

## 2025-02-08 DIAGNOSIS — R10.12 LUQ ABDOMINAL PAIN: ICD-10-CM

## 2025-02-08 DIAGNOSIS — I26.99 PULMONARY EMBOLISM, BILATERAL (HCC): ICD-10-CM

## 2025-02-08 LAB
ALBUMIN SERPL BCP-MCNC: 4.1 G/DL (ref 3.2–4.9)
ALBUMIN/GLOB SERPL: 1.3 G/DL
ALP SERPL-CCNC: 64 U/L (ref 30–99)
ALT SERPL-CCNC: 54 U/L (ref 2–50)
ANION GAP SERPL CALC-SCNC: 12 MMOL/L (ref 7–16)
AST SERPL-CCNC: 37 U/L (ref 12–45)
BASOPHILS # BLD AUTO: 0.4 % (ref 0–1.8)
BASOPHILS # BLD: 0.06 K/UL (ref 0–0.12)
BILIRUB SERPL-MCNC: 0.4 MG/DL (ref 0.1–1.5)
BUN SERPL-MCNC: 9 MG/DL (ref 8–22)
CALCIUM ALBUM COR SERPL-MCNC: 9.3 MG/DL (ref 8.5–10.5)
CALCIUM SERPL-MCNC: 9.4 MG/DL (ref 8.5–10.5)
CHLORIDE SERPL-SCNC: 105 MMOL/L (ref 96–112)
CO2 SERPL-SCNC: 21 MMOL/L (ref 20–33)
CREAT SERPL-MCNC: 0.84 MG/DL (ref 0.5–1.4)
D DIMER PPP IA.FEU-MCNC: 3.64 UG/ML (FEU) (ref 0–0.5)
EKG IMPRESSION: NORMAL
EOSINOPHIL # BLD AUTO: 0.07 K/UL (ref 0–0.51)
EOSINOPHIL NFR BLD: 0.5 % (ref 0–6.9)
ERYTHROCYTE [DISTWIDTH] IN BLOOD BY AUTOMATED COUNT: 40.3 FL (ref 35.9–50)
GFR SERPLBLD CREATININE-BSD FMLA CKD-EPI: 100 ML/MIN/1.73 M 2
GLOBULIN SER CALC-MCNC: 3.2 G/DL (ref 1.9–3.5)
GLUCOSE SERPL-MCNC: 88 MG/DL (ref 65–99)
HCG SERPL QL: NEGATIVE
HCT VFR BLD AUTO: 42 % (ref 37–47)
HGB BLD-MCNC: 14.3 G/DL (ref 12–16)
IMM GRANULOCYTES # BLD AUTO: 0.06 K/UL (ref 0–0.11)
IMM GRANULOCYTES NFR BLD AUTO: 0.4 % (ref 0–0.9)
LYMPHOCYTES # BLD AUTO: 2.19 K/UL (ref 1–4.8)
LYMPHOCYTES NFR BLD: 14.8 % (ref 22–41)
MAGNESIUM SERPL-MCNC: 1.9 MG/DL (ref 1.5–2.5)
MCH RBC QN AUTO: 30.6 PG (ref 27–33)
MCHC RBC AUTO-ENTMCNC: 34 G/DL (ref 32.2–35.5)
MCV RBC AUTO: 89.7 FL (ref 81.4–97.8)
MONOCYTES # BLD AUTO: 0.72 K/UL (ref 0–0.85)
MONOCYTES NFR BLD AUTO: 4.9 % (ref 0–13.4)
NEUTROPHILS # BLD AUTO: 11.71 K/UL (ref 1.82–7.42)
NEUTROPHILS NFR BLD: 79 % (ref 44–72)
NRBC # BLD AUTO: 0 K/UL
NRBC BLD-RTO: 0 /100 WBC (ref 0–0.2)
NT-PROBNP SERPL IA-MCNC: 77 PG/ML (ref 0–125)
PHOSPHATE SERPL-MCNC: 3.5 MG/DL (ref 2.5–4.5)
PLATELET # BLD AUTO: 236 K/UL (ref 164–446)
PMV BLD AUTO: 9 FL (ref 9–12.9)
POTASSIUM SERPL-SCNC: 4.4 MMOL/L (ref 3.6–5.5)
PROCALCITONIN SERPL-MCNC: <0.05 NG/ML
PROT SERPL-MCNC: 7.3 G/DL (ref 6–8.2)
RBC # BLD AUTO: 4.68 M/UL (ref 4.2–5.4)
SODIUM SERPL-SCNC: 138 MMOL/L (ref 135–145)
TROPONIN T SERPL-MCNC: <6 NG/L (ref 6–19)
TROPONIN T SERPL-MCNC: <6 NG/L (ref 6–19)
WBC # BLD AUTO: 14.8 K/UL (ref 4.8–10.8)

## 2025-02-08 PROCEDURE — 700102 HCHG RX REV CODE 250 W/ 637 OVERRIDE(OP)

## 2025-02-08 PROCEDURE — 36415 COLL VENOUS BLD VENIPUNCTURE: CPT

## 2025-02-08 PROCEDURE — A9270 NON-COVERED ITEM OR SERVICE: HCPCS

## 2025-02-08 PROCEDURE — 96374 THER/PROPH/DIAG INJ IV PUSH: CPT

## 2025-02-08 PROCEDURE — 85306 CLOT INHIBIT PROT S FREE: CPT

## 2025-02-08 PROCEDURE — G0378 HOSPITAL OBSERVATION PER HR: HCPCS

## 2025-02-08 PROCEDURE — 96372 THER/PROPH/DIAG INJ SC/IM: CPT

## 2025-02-08 PROCEDURE — 99285 EMERGENCY DEPT VISIT HI MDM: CPT

## 2025-02-08 PROCEDURE — 93005 ELECTROCARDIOGRAM TRACING: CPT | Mod: TC

## 2025-02-08 PROCEDURE — 93970 EXTREMITY STUDY: CPT

## 2025-02-08 PROCEDURE — 83880 ASSAY OF NATRIURETIC PEPTIDE: CPT

## 2025-02-08 PROCEDURE — 71275 CT ANGIOGRAPHY CHEST: CPT

## 2025-02-08 PROCEDURE — 700105 HCHG RX REV CODE 258

## 2025-02-08 PROCEDURE — 85379 FIBRIN DEGRADATION QUANT: CPT

## 2025-02-08 PROCEDURE — 3074F SYST BP LT 130 MM HG: CPT | Performed by: NURSE PRACTITIONER

## 2025-02-08 PROCEDURE — 84100 ASSAY OF PHOSPHORUS: CPT

## 2025-02-08 PROCEDURE — 700111 HCHG RX REV CODE 636 W/ 250 OVERRIDE (IP)

## 2025-02-08 PROCEDURE — 80053 COMPREHEN METABOLIC PANEL: CPT

## 2025-02-08 PROCEDURE — 85025 COMPLETE CBC W/AUTO DIFF WBC: CPT

## 2025-02-08 PROCEDURE — 84145 PROCALCITONIN (PCT): CPT

## 2025-02-08 PROCEDURE — 99214 OFFICE O/P EST MOD 30 MIN: CPT | Performed by: NURSE PRACTITIONER

## 2025-02-08 PROCEDURE — 700117 HCHG RX CONTRAST REV CODE 255: Performed by: EMERGENCY MEDICINE

## 2025-02-08 PROCEDURE — 93005 ELECTROCARDIOGRAM TRACING: CPT | Mod: TC | Performed by: EMERGENCY MEDICINE

## 2025-02-08 PROCEDURE — 84703 CHORIONIC GONADOTROPIN ASSAY: CPT

## 2025-02-08 PROCEDURE — 3078F DIAST BP <80 MM HG: CPT | Performed by: NURSE PRACTITIONER

## 2025-02-08 PROCEDURE — 81241 F5 GENE: CPT

## 2025-02-08 PROCEDURE — 71045 X-RAY EXAM CHEST 1 VIEW: CPT

## 2025-02-08 PROCEDURE — 99223 1ST HOSP IP/OBS HIGH 75: CPT

## 2025-02-08 PROCEDURE — 84484 ASSAY OF TROPONIN QUANT: CPT

## 2025-02-08 PROCEDURE — 83735 ASSAY OF MAGNESIUM: CPT

## 2025-02-08 RX ORDER — SODIUM CHLORIDE 9 MG/ML
1000 INJECTION, SOLUTION INTRAVENOUS ONCE
Status: COMPLETED | OUTPATIENT
Start: 2025-02-08 | End: 2025-02-08

## 2025-02-08 RX ORDER — ONDANSETRON 4 MG/1
4 TABLET, ORALLY DISINTEGRATING ORAL EVERY 4 HOURS PRN
Status: DISCONTINUED | OUTPATIENT
Start: 2025-02-08 | End: 2025-02-11 | Stop reason: HOSPADM

## 2025-02-08 RX ORDER — TRAMADOL HYDROCHLORIDE 50 MG/1
50 TABLET ORAL EVERY 6 HOURS PRN
Status: DISCONTINUED | OUTPATIENT
Start: 2025-02-08 | End: 2025-02-09

## 2025-02-08 RX ORDER — PROMETHAZINE HYDROCHLORIDE 25 MG/1
12.5-25 SUPPOSITORY RECTAL EVERY 4 HOURS PRN
Status: DISCONTINUED | OUTPATIENT
Start: 2025-02-08 | End: 2025-02-11 | Stop reason: HOSPADM

## 2025-02-08 RX ORDER — ENOXAPARIN SODIUM 100 MG/ML
1 INJECTION SUBCUTANEOUS ONCE
Status: DISPENSED | OUTPATIENT
Start: 2025-02-08 | End: 2025-02-09

## 2025-02-08 RX ORDER — OXYCODONE HYDROCHLORIDE 5 MG/1
5 TABLET ORAL
Status: DISCONTINUED | OUTPATIENT
Start: 2025-02-08 | End: 2025-02-08

## 2025-02-08 RX ORDER — ACETAMINOPHEN 325 MG/1
650 TABLET ORAL EVERY 6 HOURS PRN
Status: DISCONTINUED | OUTPATIENT
Start: 2025-02-08 | End: 2025-02-10

## 2025-02-08 RX ORDER — ENOXAPARIN SODIUM 100 MG/ML
1 INJECTION SUBCUTANEOUS EVERY 12 HOURS
Status: DISCONTINUED | OUTPATIENT
Start: 2025-02-09 | End: 2025-02-10

## 2025-02-08 RX ORDER — PROMETHAZINE HYDROCHLORIDE 25 MG/1
12.5-25 TABLET ORAL EVERY 4 HOURS PRN
Status: DISCONTINUED | OUTPATIENT
Start: 2025-02-08 | End: 2025-02-11 | Stop reason: HOSPADM

## 2025-02-08 RX ORDER — OXYCODONE HYDROCHLORIDE 5 MG/1
2.5 TABLET ORAL
Status: DISCONTINUED | OUTPATIENT
Start: 2025-02-08 | End: 2025-02-08

## 2025-02-08 RX ORDER — PROCHLORPERAZINE EDISYLATE 5 MG/ML
5-10 INJECTION INTRAMUSCULAR; INTRAVENOUS EVERY 4 HOURS PRN
Status: DISCONTINUED | OUTPATIENT
Start: 2025-02-08 | End: 2025-02-10

## 2025-02-08 RX ORDER — ONDANSETRON 2 MG/ML
4 INJECTION INTRAMUSCULAR; INTRAVENOUS EVERY 4 HOURS PRN
Status: DISCONTINUED | OUTPATIENT
Start: 2025-02-08 | End: 2025-02-11 | Stop reason: HOSPADM

## 2025-02-08 RX ORDER — MORPHINE SULFATE 4 MG/ML
2 INJECTION INTRAVENOUS
Status: DISCONTINUED | OUTPATIENT
Start: 2025-02-08 | End: 2025-02-09

## 2025-02-08 RX ADMIN — IOHEXOL 50 ML: 350 INJECTION, SOLUTION INTRAVENOUS at 16:30

## 2025-02-08 RX ADMIN — ENOXAPARIN SODIUM 80 MG: 100 INJECTION SUBCUTANEOUS at 17:38

## 2025-02-08 RX ADMIN — ACETAMINOPHEN 650 MG: 325 TABLET ORAL at 19:21

## 2025-02-08 RX ADMIN — ONDANSETRON 4 MG: 2 INJECTION INTRAMUSCULAR; INTRAVENOUS at 19:56

## 2025-02-08 RX ADMIN — OXYCODONE 5 MG: 5 TABLET ORAL at 19:13

## 2025-02-08 RX ADMIN — SODIUM CHLORIDE 1000 ML: 9 INJECTION, SOLUTION INTRAVENOUS at 19:52

## 2025-02-08 ASSESSMENT — SOCIAL DETERMINANTS OF HEALTH (SDOH)
WITHIN THE LAST YEAR, HAVE YOU BEEN KICKED, HIT, SLAPPED, OR OTHERWISE PHYSICALLY HURT BY YOUR PARTNER OR EX-PARTNER?: NO
WITHIN THE PAST 12 MONTHS, THE FOOD YOU BOUGHT JUST DIDN'T LAST AND YOU DIDN'T HAVE MONEY TO GET MORE: NEVER TRUE
WITHIN THE LAST YEAR, HAVE YOU BEEN AFRAID OF YOUR PARTNER OR EX-PARTNER?: NO
WITHIN THE PAST 12 MONTHS, YOU WORRIED THAT YOUR FOOD WOULD RUN OUT BEFORE YOU GOT THE MONEY TO BUY MORE: NEVER TRUE
WITHIN THE LAST YEAR, HAVE YOU BEEN HUMILIATED OR EMOTIONALLY ABUSED IN OTHER WAYS BY YOUR PARTNER OR EX-PARTNER?: NO
WITHIN THE LAST YEAR, HAVE TO BEEN RAPED OR FORCED TO HAVE ANY KIND OF SEXUAL ACTIVITY BY YOUR PARTNER OR EX-PARTNER?: NO
IN THE PAST 12 MONTHS, HAS THE ELECTRIC, GAS, OIL, OR WATER COMPANY THREATENED TO SHUT OFF SERVICE IN YOUR HOME?: NO

## 2025-02-08 ASSESSMENT — COGNITIVE AND FUNCTIONAL STATUS - GENERAL
MOBILITY SCORE: 24
DAILY ACTIVITIY SCORE: 24
SUGGESTED CMS G CODE MODIFIER MOBILITY: CH
SUGGESTED CMS G CODE MODIFIER DAILY ACTIVITY: CH

## 2025-02-08 ASSESSMENT — PATIENT HEALTH QUESTIONNAIRE - PHQ9
1. LITTLE INTEREST OR PLEASURE IN DOING THINGS: NOT AT ALL
SUM OF ALL RESPONSES TO PHQ9 QUESTIONS 1 AND 2: 0
2. FEELING DOWN, DEPRESSED, IRRITABLE, OR HOPELESS: NOT AT ALL

## 2025-02-08 ASSESSMENT — PAIN DESCRIPTION - PAIN TYPE
TYPE: ACUTE PAIN

## 2025-02-08 ASSESSMENT — ENCOUNTER SYMPTOMS
SHORTNESS OF BREATH: 1
PSYCHIATRIC NEGATIVE: 1
COUGH: 0
ORTHOPNEA: 1
NEUROLOGICAL NEGATIVE: 1
GASTROINTESTINAL NEGATIVE: 1
EYES NEGATIVE: 1
PALPITATIONS: 0
MUSCULOSKELETAL NEGATIVE: 1

## 2025-02-08 ASSESSMENT — LIFESTYLE VARIABLES
ALCOHOL_USE: NO
EVER HAD A DRINK FIRST THING IN THE MORNING TO STEADY YOUR NERVES TO GET RID OF A HANGOVER: NO
TOTAL SCORE: 0
TOTAL SCORE: 0
HAVE PEOPLE ANNOYED YOU BY CRITICIZING YOUR DRINKING: NO
TOTAL SCORE: 0
HOW MANY TIMES IN THE PAST YEAR HAVE YOU HAD 5 OR MORE DRINKS IN A DAY: 0
CONSUMPTION TOTAL: NEGATIVE
DOES PATIENT WANT TO STOP DRINKING: NO
ON A TYPICAL DAY WHEN YOU DRINK ALCOHOL HOW MANY DRINKS DO YOU HAVE: 0
EVER FELT BAD OR GUILTY ABOUT YOUR DRINKING: NO
HAVE YOU EVER FELT YOU SHOULD CUT DOWN ON YOUR DRINKING: NO
AVERAGE NUMBER OF DAYS PER WEEK YOU HAVE A DRINK CONTAINING ALCOHOL: 0

## 2025-02-08 ASSESSMENT — FIBROSIS 4 INDEX
FIB4 SCORE: 0.33
FIB4 SCORE: 0.33
FIB4 SCORE: 0.49

## 2025-02-08 NOTE — PROGRESS NOTES
Courtney Gayle is a 24 y.o. female who presents for Chest Pain (Pt has chest pain when breathing, SOB, hurts when laying on (L) side, side pain x 3 days )      HPI  This is a new problem. Courtney Gayle is a 24 y.o. patient who presents to urgent care with c/o:  3 days ago started to have a minor cramp in her left side.  Last night started to have pain in her left chest. Sharp pains. Having pain with inhalation in her left upper chest and left side of abdomen. Hurts when she lays on it like a muscle cramp.  Hurts to take a deep breath.  She cannot get comfortable.  Pain was intermittent but now constant.    Tx tried: ibuprofen this morning.  - helped some.   Denies fever, cough, diaphoresis, NV.   No other aggravating or alleviating factors.  Denies any other concerns at this time.       ROS See HPI    Allergies:     No Known Allergies    PMSFS Hx:  Past Medical History:   Diagnosis Date    Anxiety     Hypertension      History reviewed. No pertinent surgical history.  Family History   Problem Relation Age of Onset    Alcohol abuse Father     Alcohol abuse Maternal Grandfather     Psychiatric Illness Sister         Bipolar disorder II     Social History     Tobacco Use    Smoking status: Never    Smokeless tobacco: Never   Substance Use Topics    Alcohol use: Not Currently     Comment: Occasional         Problems:   Patient Active Problem List   Diagnosis    Anxiety and depression    Irregular periods    Seasonal allergies    Hypercholesterolemia    Overweight (BMI 25.0-29.9)    Diarrhea    Anti-TPO antibodies present    Dyslipidemia       Medications:   Current Outpatient Medications on File Prior to Visit   Medication Sig Dispense Refill    norethindrone-ethinyl estradiol-iron (MICROGESTIN FE 1.5/30) 1.5-30 MG-MCG tablet Take 1 Tablet by mouth every day. 84 Tablet 1    hydrOXYzine HCl (ATARAX) 10 MG Tab Take 10 mg by mouth 3 times a day as needed. (Patient not taking: Reported on 2/8/2025)       No  "current facility-administered medications on file prior to visit.        Objective:     /70 (BP Location: Left arm, Patient Position: Sitting, BP Cuff Size: Adult)   Pulse (!) 106   Temp 36.3 °C (97.4 °F) (Temporal)   Resp 16   Ht 1.626 m (5' 4\")   Wt 86.1 kg (189 lb 14.4 oz)   SpO2 99%   BMI 32.60 kg/m²     Physical Exam  Vitals and nursing note reviewed.   Constitutional:       General: She is in acute distress.      Appearance: Normal appearance. She is well-developed and well-groomed. She is not ill-appearing or toxic-appearing.   HENT:      Right Ear: Hearing normal.      Left Ear: Hearing normal.      Mouth/Throat:      Lips: Pink.      Mouth: Mucous membranes are moist.   Eyes:      General: Lids are normal.   Neck:      Trachea: Trachea and phonation normal.   Cardiovascular:      Rate and Rhythm: Normal rate and regular rhythm.      Pulses: Normal pulses.      Heart sounds: Normal heart sounds.   Pulmonary:      Effort: Pulmonary effort is normal.      Breath sounds: Normal breath sounds.   Chest:      Chest wall: Tenderness present.       Abdominal:      General: There is no distension.      Palpations: Abdomen is soft. There is no mass.      Tenderness: There is abdominal tenderness (LUQ). There is guarding (Left side). There is no right CVA tenderness, left CVA tenderness or rebound.   Musculoskeletal:      Cervical back: Full passive range of motion without pain and normal range of motion.   Skin:     General: Skin is warm and dry.      Capillary Refill: Capillary refill takes less than 2 seconds.   Neurological:      Mental Status: She is alert and oriented to person, place, and time.   Psychiatric:         Mood and Affect: Mood normal.         Speech: Speech normal.         Behavior: Behavior normal. Behavior is cooperative.         Thought Content: Thought content normal.         Assessment /Associated Orders:      1. Left-sided chest wall pain        2. LUQ abdominal pain      "         Medical Decision Making:        Pt's clinical presentation and exam today indicate a need for higher level of care with further evaluation and/or diagnostics.  Patient appears to be in significant distress in the urgent care today.  Her breath is taken away with every movement.  She reports that she feels like she cannot breathe well.  No x-ray or advanced diagnostics are available in the urgent care today.  [facility] was  called to arrange transfer to higher level of care in ER.  Pt is to be transported via POV.   I have reiterated to patient that although an Urgent Care to ER transfer was made this will not necessarily expedite the ER process          Please note that this dictation was created using voice recognition software. I have worked with consultants from the vendor as well as technical experts from [facility] to optimize the interface. I have made every reasonable attempt to correct obvious errors, but I expect that there are errors of grammar and possibly content that I did not discover before finalizing the note.  This note was electronically signed by provider

## 2025-02-08 NOTE — ED PROVIDER NOTES
ED Provider Note    CHIEF COMPLAINT  Chief Complaint   Patient presents with    Chest Pain     Sharp L side pain began this morning, 6/10 and 8/10 when laying, worse with deep resp, no coughing, no resp or cardiac hx, hx HTN       EXTERNAL RECORDS REVIEWED  Urgent care note from prior to arrival, chest pain shortness of breath, sent to ER for higher level of care    BHARTI/BRAULIO Valdez Lina Gayle is a 24 y.o. female who presents from urgent care for evaluation of left-sided chest pain.  This been present in a very mild fashion for the past 3 days but this morning, actually in fact it woke her up from sleep, it has been severe.  It is increased in severity when she lays backwards.  It is worse when she takes a deep breath or moves in certain ways.  No coughing.  No leg pain or swelling.  Pain seems to extend down to the left upper abdomen underneath the ribs.  No vomiting or back pain.  Does not have any significant medical problems.  No history of DVT or PE.  Does take birth control pills.    PAST MEDICAL HISTORY   has a past medical history of Anxiety and Hypertension.    SURGICAL HISTORY  patient denies any surgical history    FAMILY HISTORY  Family History   Problem Relation Age of Onset    Alcohol abuse Father     Alcohol abuse Maternal Grandfather     Psychiatric Illness Sister         Bipolar disorder II       SOCIAL HISTORY  Social History     Tobacco Use    Smoking status: Never    Smokeless tobacco: Never   Vaping Use    Vaping status: Never Used   Substance and Sexual Activity    Alcohol use: Yes     Comment: Occasional    Drug use: Never    Sexual activity: Yes     Partners: Female, Male     Birth control/protection: Condom, Pill       CURRENT MEDICATIONS  Home Medications       Reviewed by Rashmi Lo R.N. (Registered Nurse) on 02/08/25 at 1345  Med List Status: Partial     Medication Last Dose Status   hydrOXYzine HCl (ATARAX) 10 MG Tab  Active   norethindrone-ethinyl estradiol-iron (MICROGESTIN  "FE .) 1.5-30 MG-MCG tablet  Active                    ALLERGIES  No Known Allergies    PHYSICAL EXAM  VITAL SIGNS: /61   Pulse 93   Temp 36.3 °C (97.4 °F) (Temporal)   Resp 18   Ht 1.6 m (5' 3\")   Wt 86 kg (189 lb 9.5 oz)   SpO2 96%   BMI 33.59 kg/m²    Constitutional: Well appearing patient in no acute distress.  Awake and alert, not toxic nor ill in appearance.  HENT: Normocephalic, no obvious evidence of acute trauma.  Eyes: No scleral icterus. Normal conjunctiva   Thorax & Lungs: Normal nonlabored respirations.  Left anterior chest wall is tender on palpation without crepitation or deformity.  I appreciate no wheezing, rhonchi or rales. There is normal air movement.  Upon cardiac ascultation I appreciate a regular heart rhythm and a normal rate.   Abdomen: The abdomen is not visibly distended. Upon palpation, I find it to be without tenderness.  No mass appreciated.  Skin: The exposed portions of skin reveal no obvious rash or other abnormalities.  Extremities/Musculoskeletal: No obvious sign of acute trauma. No asymmetric calf tenderness or edema.   Neurologic: Alert & oriented. No focal deficits observed.       EKG/LABS  Results for orders placed or performed during the hospital encounter of 25   EKG    Collection Time: 25  2:43 PM   Result Value Ref Range    Report       [facility] Emergency Dept.    Test Date:  2025  Pt Name:    ARTURO MOROCHO              Department: ER  MRN:        [id number]                      Room:  Gender:     Female                       Technician: [id number]  :        [id number]                   Requested By:ER TRIAGE PROTOCOL  Order #:    [id number]                    Reading MD: ANNABELLA BROWNING MD    Measurements  Intervals                                Axis  Rate:       88                           P:          55  MT:         146                          QRS:        74  QRSD:       82                           T:          " 29  QT:         351  QTc:        425    Interpretive Statements  Sinus rhythm rate of 88, narrow QRS, normal VA and QT intervals, upright T  waves.  No ST segment deviation.  My impression: No acute ischemia  Electronically Signed On 02- 14:43:31 PST by ANNABELLA BROWNING MD     CBC WITH DIFFERENTIAL    Collection Time: 02/08/25  2:46 PM   Result Value Ref Range    WBC 14.8 (H) 4.8 - 10.8 K/uL    RBC 4.68 4.20 - 5.40 M/uL    Hemoglobin 14.3 12.0 - 16.0 g/dL    Hematocrit 42.0 37.0 - 47.0 %    MCV 89.7 81.4 - 97.8 fL    MCH 30.6 27.0 - 33.0 pg    MCHC 34.0 32.2 - 35.5 g/dL    RDW 40.3 35.9 - 50.0 fL    Platelet Count 236 164 - 446 K/uL    MPV 9.0 9.0 - 12.9 fL    Neutrophils-Polys 79.00 (H) 44.00 - 72.00 %    Lymphocytes 14.80 (L) 22.00 - 41.00 %    Monocytes 4.90 0.00 - 13.40 %    Eosinophils 0.50 0.00 - 6.90 %    Basophils 0.40 0.00 - 1.80 %    Immature Granulocytes 0.40 0.00 - 0.90 %    Nucleated RBC 0.00 0.00 - 0.20 /100 WBC    Neutrophils (Absolute) 11.71 (H) 1.82 - 7.42 K/uL    Lymphs (Absolute) 2.19 1.00 - 4.80 K/uL    Monos (Absolute) 0.72 0.00 - 0.85 K/uL    Eos (Absolute) 0.07 0.00 - 0.51 K/uL    Baso (Absolute) 0.06 0.00 - 0.12 K/uL    Immature Granulocytes (abs) 0.06 0.00 - 0.11 K/uL    NRBC (Absolute) 0.00 K/uL   COMP METABOLIC PANEL    Collection Time: 02/08/25  2:46 PM   Result Value Ref Range    Sodium 138 135 - 145 mmol/L    Potassium 4.4 3.6 - 5.5 mmol/L    Chloride 105 96 - 112 mmol/L    Co2 21 20 - 33 mmol/L    Anion Gap 12.0 7.0 - 16.0    Glucose 88 65 - 99 mg/dL    Bun 9 8 - 22 mg/dL    Creatinine 0.84 0.50 - 1.40 mg/dL    Calcium 9.4 8.5 - 10.5 mg/dL    Correct Calcium 9.3 8.5 - 10.5 mg/dL    AST(SGOT) 37 12 - 45 U/L    ALT(SGPT) 54 (H) 2 - 50 U/L    Alkaline Phosphatase 64 30 - 99 U/L    Total Bilirubin 0.4 0.1 - 1.5 mg/dL    Albumin 4.1 3.2 - 4.9 g/dL    Total Protein 7.3 6.0 - 8.2 g/dL    Globulin 3.2 1.9 - 3.5 g/dL    A-G Ratio 1.3 g/dL   TROPONIN    Collection Time: 02/08/25  2:46 PM    Result Value Ref Range    Troponin T <6 6 - 19 ng/L   D-DIMER    Collection Time: 02/08/25  2:46 PM   Result Value Ref Range    D-Dimer 3.64 (H) 0.00 - 0.50 ug/mL (FEU)   ESTIMATED GFR    Collection Time: 02/08/25  2:46 PM   Result Value Ref Range    GFR (CKD-EPI) 100 >60 mL/min/1.73 m 2   HCG QUAL SERUM    Collection Time: 02/08/25  2:46 PM   Result Value Ref Range    Beta-Hcg Qualitative Serum Negative Negative      I have independently interpreted this EKG    RADIOLOGY/PROCEDURES   I have independently interpreted the diagnostic imaging associated with this visit and am waiting the final reading from the radiologist.   My preliminary interpretation is as follows: No pneumothorax.  Bilateral pulmonary emboli    Radiologist interpretation:  DX-CHEST-PORTABLE (1 VIEW)   Final Result         Minimal left basilar pulmonary opacification could potentially indicate developing pneumonitis or pneumonia versus atelectasis.      CT-CTA CHEST PULMONARY ARTERY W/ RECONS    (Results Pending)       Point of Care Ultrasound    ED POINT OF CARE ULTRASOUND: LIMITED CARDIAC    Indication for exam: Evaluate for pericardial effusion  LVEF: normal  Pericardial Effusion:not present  RV Strain:not present    Image retained through [facility] as seen below:             Additional interpretation: No pericardial effusion, no evidence of right-sided heart strain or LV dysfunction    This study is a limited ultrasound examination performed and interpreted to evaluate for limited conditions as outlined above. There may be other clinically important information contained in the images that is outside this scope. When clinically warranted, a comprehensive ultrasound through the appropriate department is considered.     COURSE & MEDICAL DECISION MAKING    ASSESSMENT, COURSE AND PLAN  Care Narrative: This is a 24-year-old female comes from urgent care for evaluation of a pleuritic as well as positional left-sided chest pain that sharp but  reproducible on exam.  She was tachycardic at urgent care.  She is on birth control pills.  There is no obvious evidence of DVT, she is not hypoxic but still cannot exclude pulmonary embolism based on clinical criteria so a D-dimer will be obtained.  Troponin will be obtained with blood work.  Bedside ultrasound does not reveal a pericardial effusion, that was performed to rule out the effusion given her positional nature of the pain.  Is also had no evidence of right heart strain either.  Chest x-ray will be obtained exclude pneumothorax and infiltrate.  Regular blood work as well.  She will be reevaluated    Her blood work is remarkable for significant elevation in her D-dimer.  Otherwise troponin negative.  No anemia.  Mild leukocytosis.  Normal GFR.  CTA of the pulmonary vasculature is ordered revealing bilateral pulmonary emboli.  She will be treated with Lovenox.  The patient does tell me that she is on oral birth control pills and recently traveled via car about 8 hours from [place].  I suspect this is probably the etiology of her thromboembolic process.  Given the significant bilateral clot burden the patient will be admitted to the hospital for further evaluation and care        DISPOSITION AND DISCUSSIONS  I have discussed management of the patient with the following physicians and SHANEKA's: YULI Joyce, Admitting hospitalist      FINAL DIAGNOSIS  1. Pulmonary embolism, bilateral (HCC)         Electronically signed by: Juan Carlos Shultz M.D., 2/8/2025 2:19 PM

## 2025-02-08 NOTE — ED NOTES
Pt ambulated to [facility]. Spouse is at bedside. Pt gowned and call light in reach. Chart up for ERP

## 2025-02-09 ENCOUNTER — APPOINTMENT (OUTPATIENT)
Dept: CARDIOLOGY | Facility: MEDICAL CENTER | Age: 25
DRG: 176 | End: 2025-02-09
Payer: COMMERCIAL

## 2025-02-09 LAB
ANION GAP SERPL CALC-SCNC: 10 MMOL/L (ref 7–16)
BUN SERPL-MCNC: 8 MG/DL (ref 8–22)
CALCIUM SERPL-MCNC: 8.7 MG/DL (ref 8.5–10.5)
CHLORIDE SERPL-SCNC: 108 MMOL/L (ref 96–112)
CO2 SERPL-SCNC: 22 MMOL/L (ref 20–33)
CREAT SERPL-MCNC: 0.75 MG/DL (ref 0.5–1.4)
EKG IMPRESSION: NORMAL
ERYTHROCYTE [DISTWIDTH] IN BLOOD BY AUTOMATED COUNT: 40.8 FL (ref 35.9–50)
GFR SERPLBLD CREATININE-BSD FMLA CKD-EPI: 115 ML/MIN/1.73 M 2
GLUCOSE SERPL-MCNC: 130 MG/DL (ref 65–99)
HCT VFR BLD AUTO: 39 % (ref 37–47)
HGB BLD-MCNC: 13.2 G/DL (ref 12–16)
LV EJECT FRACT  99904: 60
LV EJECT FRACT MOD 2C 99903: 52.02
LV EJECT FRACT MOD 4C 99902: 63.41
LV EJECT FRACT MOD BP 99901: 54.21
MCH RBC QN AUTO: 30.6 PG (ref 27–33)
MCHC RBC AUTO-ENTMCNC: 33.8 G/DL (ref 32.2–35.5)
MCV RBC AUTO: 90.5 FL (ref 81.4–97.8)
PLATELET # BLD AUTO: 228 K/UL (ref 164–446)
PMV BLD AUTO: 9.2 FL (ref 9–12.9)
POTASSIUM SERPL-SCNC: 4.1 MMOL/L (ref 3.6–5.5)
RBC # BLD AUTO: 4.31 M/UL (ref 4.2–5.4)
SODIUM SERPL-SCNC: 140 MMOL/L (ref 135–145)
WBC # BLD AUTO: 10.5 K/UL (ref 4.8–10.8)

## 2025-02-09 PROCEDURE — 700102 HCHG RX REV CODE 250 W/ 637 OVERRIDE(OP)

## 2025-02-09 PROCEDURE — 99233 SBSQ HOSP IP/OBS HIGH 50: CPT | Mod: FS | Performed by: INTERNAL MEDICINE

## 2025-02-09 PROCEDURE — 93010 ELECTROCARDIOGRAM REPORT: CPT | Performed by: INTERNAL MEDICINE

## 2025-02-09 PROCEDURE — 700111 HCHG RX REV CODE 636 W/ 250 OVERRIDE (IP): Mod: JZ

## 2025-02-09 PROCEDURE — 96372 THER/PROPH/DIAG INJ SC/IM: CPT

## 2025-02-09 PROCEDURE — 85303 CLOT INHIBIT PROT C ACTIVITY: CPT

## 2025-02-09 PROCEDURE — A9270 NON-COVERED ITEM OR SERVICE: HCPCS

## 2025-02-09 PROCEDURE — 96376 TX/PRO/DX INJ SAME DRUG ADON: CPT

## 2025-02-09 PROCEDURE — 93306 TTE W/DOPPLER COMPLETE: CPT

## 2025-02-09 PROCEDURE — 93306 TTE W/DOPPLER COMPLETE: CPT | Mod: 26 | Performed by: INTERNAL MEDICINE

## 2025-02-09 PROCEDURE — 96375 TX/PRO/DX INJ NEW DRUG ADDON: CPT

## 2025-02-09 PROCEDURE — 36415 COLL VENOUS BLD VENIPUNCTURE: CPT

## 2025-02-09 PROCEDURE — 770020 HCHG ROOM/CARE - TELE (206)

## 2025-02-09 PROCEDURE — 85027 COMPLETE CBC AUTOMATED: CPT

## 2025-02-09 PROCEDURE — 80048 BASIC METABOLIC PNL TOTAL CA: CPT

## 2025-02-09 RX ORDER — TRAMADOL HYDROCHLORIDE 50 MG/1
50 TABLET ORAL EVERY 6 HOURS PRN
Status: DISCONTINUED | OUTPATIENT
Start: 2025-02-09 | End: 2025-02-10

## 2025-02-09 RX ORDER — KETOROLAC TROMETHAMINE 15 MG/ML
15 INJECTION, SOLUTION INTRAMUSCULAR; INTRAVENOUS EVERY 6 HOURS
Status: DISCONTINUED | OUTPATIENT
Start: 2025-02-09 | End: 2025-02-09

## 2025-02-09 RX ORDER — OXYCODONE HYDROCHLORIDE 5 MG/1
5 TABLET ORAL
Status: DISCONTINUED | OUTPATIENT
Start: 2025-02-09 | End: 2025-02-09

## 2025-02-09 RX ORDER — MORPHINE SULFATE 4 MG/ML
2 INJECTION INTRAVENOUS
Status: DISCONTINUED | OUTPATIENT
Start: 2025-02-09 | End: 2025-02-11 | Stop reason: HOSPADM

## 2025-02-09 RX ORDER — OXYCODONE HYDROCHLORIDE 10 MG/1
10 TABLET ORAL
Status: DISCONTINUED | OUTPATIENT
Start: 2025-02-09 | End: 2025-02-09

## 2025-02-09 RX ORDER — POLYETHYLENE GLYCOL 3350 17 G/17G
1 POWDER, FOR SOLUTION ORAL
Status: DISCONTINUED | OUTPATIENT
Start: 2025-02-09 | End: 2025-02-11 | Stop reason: HOSPADM

## 2025-02-09 RX ORDER — AMOXICILLIN 250 MG
2 CAPSULE ORAL EVERY EVENING
Status: DISCONTINUED | OUTPATIENT
Start: 2025-02-09 | End: 2025-02-11 | Stop reason: HOSPADM

## 2025-02-09 RX ORDER — HYDROMORPHONE HYDROCHLORIDE 1 MG/ML
0.25 INJECTION, SOLUTION INTRAMUSCULAR; INTRAVENOUS; SUBCUTANEOUS ONCE
Status: COMPLETED | OUTPATIENT
Start: 2025-02-09 | End: 2025-02-09

## 2025-02-09 RX ORDER — MORPHINE SULFATE 4 MG/ML
4 INJECTION INTRAVENOUS
Status: DISCONTINUED | OUTPATIENT
Start: 2025-02-09 | End: 2025-02-09

## 2025-02-09 RX ORDER — IBUPROFEN 800 MG/1
800 TABLET, FILM COATED ORAL 3 TIMES DAILY PRN
Status: DISCONTINUED | OUTPATIENT
Start: 2025-02-12 | End: 2025-02-09

## 2025-02-09 RX ADMIN — ENOXAPARIN SODIUM 80 MG: 100 INJECTION SUBCUTANEOUS at 06:43

## 2025-02-09 RX ADMIN — MORPHINE SULFATE 2 MG: 4 INJECTION, SOLUTION INTRAMUSCULAR; INTRAVENOUS at 17:09

## 2025-02-09 RX ADMIN — SENNOSIDES AND DOCUSATE SODIUM 2 TABLET: 50; 8.6 TABLET ORAL at 20:18

## 2025-02-09 RX ADMIN — TIZANIDINE 2 MG: 4 TABLET ORAL at 23:47

## 2025-02-09 RX ADMIN — ENOXAPARIN SODIUM 80 MG: 100 INJECTION SUBCUTANEOUS at 18:09

## 2025-02-09 RX ADMIN — TRAMADOL HYDROCHLORIDE 50 MG: 50 TABLET, COATED ORAL at 18:09

## 2025-02-09 RX ADMIN — MORPHINE SULFATE 2 MG: 4 INJECTION, SOLUTION INTRAMUSCULAR; INTRAVENOUS at 09:20

## 2025-02-09 RX ADMIN — ONDANSETRON 4 MG: 2 INJECTION INTRAMUSCULAR; INTRAVENOUS at 03:30

## 2025-02-09 RX ADMIN — TRAMADOL HYDROCHLORIDE 50 MG: 50 TABLET, COATED ORAL at 11:55

## 2025-02-09 RX ADMIN — HYDROMORPHONE HYDROCHLORIDE 0.25 MG: 1 INJECTION, SOLUTION INTRAMUSCULAR; INTRAVENOUS; SUBCUTANEOUS at 06:43

## 2025-02-09 RX ADMIN — TRAMADOL HYDROCHLORIDE 50 MG: 50 TABLET, COATED ORAL at 05:50

## 2025-02-09 RX ADMIN — MORPHINE SULFATE 2 MG: 4 INJECTION, SOLUTION INTRAMUSCULAR; INTRAVENOUS at 03:31

## 2025-02-09 RX ADMIN — ACETAMINOPHEN 650 MG: 325 TABLET ORAL at 00:18

## 2025-02-09 RX ADMIN — MORPHINE SULFATE 2 MG: 4 INJECTION, SOLUTION INTRAMUSCULAR; INTRAVENOUS at 04:43

## 2025-02-09 RX ADMIN — MORPHINE SULFATE 2 MG: 4 INJECTION, SOLUTION INTRAMUSCULAR; INTRAVENOUS at 20:15

## 2025-02-09 ASSESSMENT — PAIN DESCRIPTION - PAIN TYPE
TYPE: ACUTE PAIN

## 2025-02-09 ASSESSMENT — ENCOUNTER SYMPTOMS
DIZZINESS: 0
PALPITATIONS: 0
DEPRESSION: 0
MYALGIAS: 0
FALLS: 0
CHILLS: 0
COUGH: 0
BLURRED VISION: 0
SORE THROAT: 0
NAUSEA: 0
HEADACHES: 0
SHORTNESS OF BREATH: 0
FEVER: 0
FLANK PAIN: 0
VOMITING: 0
ABDOMINAL PAIN: 0
DOUBLE VISION: 0
WEAKNESS: 0

## 2025-02-09 NOTE — CARE PLAN
The patient is Watcher - Medium risk of patient condition declining or worsening    Shift Goals  Clinical Goals: Echo, tele monitoring, VS, labs  Patient Goals: Rest, diagnostics  Family Goals: Pt condition to improve    Progress made toward(s) clinical / shift goals:      Problem: Pain - Standard  Goal: Alleviation of pain or a reduction in pain to the patient’s comfort goal  Description: Target End Date:  Prior to discharge or change in level of care    Document on Vitals flowsheet    1.  Document pain using the appropriate pain scale per order or unit policy  2.  Educate and implement non-pharmacologic comfort measures (i.e. relaxation, distraction, massage, cold/heat therapy, etc.)  3.  Pain management medications as ordered  4.  Reassess pain after pain med administration per policy  5.  If opiods administered assess patient's response to pain medication is appropriate per POSS sedation scale  6.  Follow pain management plan developed in collaboration with patient and interdisciplinary team  2/9/2025 0307 by Cheryl Gill R.N.  Outcome: Progressing, reports pain managed with tylenol. Declining narcotic pain medication at this time     Problem: Knowledge Deficit - Standard  Goal: Patient and family/care givers will demonstrate understanding of plan of care, disease process/condition, diagnostic tests and medications  Description: Target End Date:  1-3 days or as soon as patient condition allows    Document in Patient Education    1.  Patient and family/caregiver oriented to unit, equipment, visitation policy and means for communicating concern  2.  Complete/review Learning Assessment  3.  Assess knowledge level of disease process/condition, treatment plan, diagnostic tests and medications  4.  Explain disease process/condition, treatment plan, diagnostic tests and medications  2/9/2025 0307 by Cheryl Gill R.N.  Outcome: Progressing, verbalized understanding of education provided     Problem: Syncope  Management  Goal: Patient's signs and symptoms of syncope will be assessed and managed  Description: Target End Date:    Document on Assessment flowsheet    Monitor for common signs and symptoms of syncope includin.  Nausea and vomiting  2.  Peripheral vascular changes  3.  Confusion/disorientation  4.  Vision changes (tunnel or blurred vision)  2025 by Cheryl Gill R.N.  Outcome: Progressing    Goal: Cardiac arrhythmias will be absent or managed  Description: Target End Date:  Prior to discharge or change in level of care    1.  Echocardiogram, cardiac monitoring, and labs per order  2.  Monitor signs and symptoms of cardiac arrhythmias  2025 by Cheryl Gill R.N.  Outcome: Progressing    Goal: Patient's vital signs will be with within normal range or improve  Description: Target End Date:  Prior to discharge or change in level of care    1.  Educate the patient to change positions slowly to allow BP to accommodate and prevent future episodes  2.  Evaluate patient orthostatic blood pressure per order and as needed  3.  Evaluate patient blood pressure per order and as needed  4.  Encourage adequate fluid intake to prevent worsening hypotension as appropriate  2025 by Cheryl Gill R.N.  Outcome: Progressing    Goal: Patient's risk for medication side effects that could worsen signs and symptoms of syncope will be decreased  Description: Target End Date:  Prior to discharge or change in level of care    1.  Identify medications that increase injury or risk of syncope episodes  2.  Collaborate with provider to adjust BP medications to prevent syncope episodes  2025 by Cheryl Gill R.N.  Outcome: Progressing     Problem: Optimal Care of the Patient with VTE  Goal: Peripheral tissue perfusion will improve  Description: Target End Date:  Prior to discharge or change in level of care    Document on Assessment flowsheet    1. Assess peripheral pulses  2. Evaluate  medication effects  3. Encourage avoidance of crossing legs  2/9/2025 0307 by Cheryl Gill R.N.  Outcome: Progressing, BLE US negative for DVT. Continues on lovenox SQ. Peripheral pulses +2    Goal: Complications related to the disease process, condition or treatment will be avoided or minimized  Description: Target End Date:  Prior to discharge or change in level of care    Document on Assessment flowsheet    1. Assess signs and symptoms of pulmonary embolism  2. Provide venous thromboembolism (VTE) prophylaxis  3. Monitor diagnostic test results  2/9/2025 0307 by Cheryl Gill R.N.  Outcome: Progressing  Goal: Symptoms of redness, swelling, and pain at the site of impaired tissue perfusion will improve  Description: Target End Date:  Prior to discharge or change in level of care    Document on Assessment flowsheet    1. Monitor signs of edema  2. Implement elevation of affected extremity or extremities  3. Monitor capillary refill  4. Monitor circulation, sensation and/or motion of extremity  5. Encourage elimination of constricting items (ex:  clothing, shoes, jewelry)  6. Encourage wearing comfortable, properly fitted shoes  2/9/2025 0307 by Cheryl Gill R.N.  Outcome: Progressing     Problem: Respiratory  Goal: Patient will achieve/maintain optimum respiratory ventilation and gas exchange  Description: Target End Date:  Prior to discharge or change in level of care    Document on Assessment flowsheet    1.  Assess and monitor rate, rhythm, depth and effort of respiration  2.  Breath sounds assessed qshift and/or as needed  3.  Assess O2 saturation, administer/titrate oxygen as ordered  4.  Position patient for maximum ventilatory efficiency  5.  Turn, cough, and deep breath with splinting to improve effectiveness  6.  Collaborate with RT to administer medication/treatments per order  7.  Encourage use of incentive spirometer and encourage patient to cough after use and utilize splinting techniques if  applicable  8.  Airway suctioning  9.  Monitor sputum production for changes in color, consistency and frequency  10. Perform frequent oral hygiene  11. Alternate physical activity with rest periods  2/9/2025 0307 by Cheryl Gill R.N.  Outcome: Progressing    Problem: Knowledge Deficit - VTE  Goal: Patient and family/care givers will demonstrate understanding of plan of care, disease process/condition, diagnostic tests and medications  Description: Target End Date:  1-3 days or as soon as patient condition allows    Document in Patient Education    1.  Discuss information regarding preoperative preparation and procedure  2.  Educate about postoperative course  3.  Oriented to unit, equipment, visitation policy and means for communicating concern  4.  Complete/review Learning Assessment  5.  Assess knowledge level of disease process/condition, treatment plan, diagnostic tests and medications  6.  Explain disease process/condition, treatment plan, diagnostic tests and medications  2/9/2025 0307 by Cheryl Gill R.N.  Outcome: Progressing

## 2025-02-09 NOTE — CARE PLAN
The patient is Watcher - Medium risk of patient condition declining or worsening    Shift Goals  Clinical Goals: echo, tele monitoring, pain relief  Patient Goals: rest, feel better  Family Goals: no family present    Progress made toward(s) clinical / shift goals:    Problem: Pain - Standard  Goal: Alleviation of pain or a reduction in pain to the patient’s comfort goal  Description: Target End Date:  Prior to discharge or change in level of care    Document on Vitals flowsheet    1.  Document pain using the appropriate pain scale per order or unit policy  2.  Educate and implement non-pharmacologic comfort measures (i.e. relaxation, distraction, massage, cold/heat therapy, etc.)  3.  Pain management medications as ordered  4.  Reassess pain after pain med administration per policy  5.  If opiods administered assess patient's response to pain medication is appropriate per POSS sedation scale  6.  Follow pain management plan developed in collaboration with patient and interdisciplinary team (including palliative care or pain specialists if applicable)  Outcome: Progressing     Problem: Knowledge Deficit - Standard  Goal: Patient and family/care givers will demonstrate understanding of plan of care, disease process/condition, diagnostic tests and medications  Description: Target End Date:  1-3 days or as soon as patient condition allows    Document in Patient Education    1.  Patient and family/caregiver oriented to unit, equipment, visitation policy and means for communicating concern  2.  Complete/review Learning Assessment  3.  Assess knowledge level of disease process/condition, treatment plan, diagnostic tests and medications  4.  Explain disease process/condition, treatment plan, diagnostic tests and medications  Outcome: Progressing     Problem: Syncope Management  Goal: Patient's signs and symptoms of syncope will be assessed and managed  Description: Target End Date:    Document on Assessment  flowsheet    Monitor for common signs and symptoms of syncope includin.  Nausea and vomiting  2.  Peripheral vascular changes  3.  Confusion/disorientation  4.  Vision changes (tunnel or blurred vision)  Outcome: Progressing  Goal: Cardiac arrhythmias will be absent or managed  Description: Target End Date:  Prior to discharge or change in level of care    1.  Echocardiogram, cardiac monitoring, and labs per order  2.  Monitor signs and symptoms of cardiac arrhythmias  Outcome: Progressing  Goal: Patient's vital signs will be with within normal range or improve  Description: Target End Date:  Prior to discharge or change in level of care    1.  Educate the patient to change positions slowly to allow BP to accommodate and prevent future episodes  2.  Evaluate patient orthostatic blood pressure per order and as needed  3.  Evaluate patient blood pressure per order and as needed  4.  Encourage adequate fluid intake to prevent worsening hypotension as appropriate  Outcome: Progressing  Goal: Patient's risk for medication side effects that could worsen signs and symptoms of syncope will be decreased  Description: Target End Date:  Prior to discharge or change in level of care    1.  Identify medications that increase injury or risk of syncope episodes  2.  Collaborate with provider to adjust BP medications to prevent syncope episodes  Outcome: Progressing     Problem: Optimal Care of the Patient with VTE  Goal: Peripheral tissue perfusion will improve  Description: Target End Date:  Prior to discharge or change in level of care    Document on Assessment flowsheet    1. Assess peripheral pulses  2. Evaluate medication effects  3. Encourage avoidance of crossing legs  Outcome: Progressing  Goal: Complications related to the disease process, condition or treatment will be avoided or minimized  Description: Target End Date:  Prior to discharge or change in level of care    Document on Assessment flowsheet    1. Assess  signs and symptoms of pulmonary embolism  2. Provide venous thromboembolism (VTE) prophylaxis  3. Monitor diagnostic test results  Outcome: Progressing  Goal: Symptoms of redness, swelling, and pain at the site of impaired tissue perfusion will improve  Description: Target End Date:  Prior to discharge or change in level of care    Document on Assessment flowsheet    1. Monitor signs of edema  2. Implement elevation of affected extremity or extremities  3. Monitor capillary refill  4. Monitor circulation, sensation and/or motion of extremity  5. Encourage elimination of constricting items (ex:  clothing, shoes, jewelry)  6. Encourage wearing comfortable, properly fitted shoes  Outcome: Progressing     Problem: Respiratory  Goal: Patient will achieve/maintain optimum respiratory ventilation and gas exchange  Description: Target End Date:  Prior to discharge or change in level of care    Document on Assessment flowsheet    1.  Assess and monitor rate, rhythm, depth and effort of respiration  2.  Breath sounds assessed qshift and/or as needed  3.  Assess O2 saturation, administer/titrate oxygen as ordered  4.  Position patient for maximum ventilatory efficiency  5.  Turn, cough, and deep breath with splinting to improve effectiveness  6.  Collaborate with RT to administer medication/treatments per order  7.  Encourage use of incentive spirometer and encourage patient to cough after use and utilize splinting techniques if applicable  8.  Airway suctioning  9.  Monitor sputum production for changes in color, consistency and frequency  10. Perform frequent oral hygiene  11. Alternate physical activity with rest periods  Outcome: Progressing     Problem: Knowledge Deficit - VTE  Goal: Patient and family/care givers will demonstrate understanding of plan of care, disease process/condition, diagnostic tests and medications  Description: Target End Date:  1-3 days or as soon as patient condition allows    Document in Patient  Education    1.  Discuss information regarding preoperative preparation and procedure  2.  Educate about postoperative course  3.  Oriented to unit, equipment, visitation policy and means for communicating concern  4.  Complete/review Learning Assessment  5.  Assess knowledge level of disease process/condition, treatment plan, diagnostic tests and medications  6.  Explain disease process/condition, treatment plan, diagnostic tests and medications  Outcome: Progressing     Problem: Discharge Barriers/Planning  Goal: Patient's continuum of care needs are met  Description: Target End Date:  Prior to discharge or change in level of care    1.  Identify potential discharge barriers on admission and throughout hospitalization  2.  Collaborate with Case Management, , Clinical Educators, Navigators and others on the transitional care team to meet discharge needs  3.  Involve patient/family/caregivers in setting and prioritizing goals for hospitalization and discharge  4.  Ensure Flu vaccinations are addressed  5.  Inquire if patient is interested in the Meds to Bed program  6.  Ensure patient and family/caregiver are able to demonstrate use of equipment as prescribed  7.  Ensure patient and family/caregiver can verbalize understanding of patient education  8.  Explain discharge instructions and medication reconciliation to patient and family/caregiver  Outcome: Progressing       Patient is not progressing towards the following goals:

## 2025-02-09 NOTE — PROGRESS NOTES
At 0316, pt notified this RN of sudden onset sharp left-sided chest pain. Noted pale, grimacing. Reports difficulty with inspiration/expiration d/t pain. RR 24 96% on 1L O2, (O2 in place d/t pt request for comfort O2), , /69, T 99.5F. Medication with morphine and Zofran, see MAR. YULI Rock notified and requested update if pain intervention ineffective. Frequent monitoring carried out, pt educated to verbalize any improvement/worsening of s/s.

## 2025-02-09 NOTE — ASSESSMENT & PLAN NOTE
No evidence of right heart strain on imaging   Started on weight based lovenox  Echocardiogram ordered pending results   Bilateral lower extremity duplex negative for DVT  Factor testing- protein S ordered  Likely related to oral contraceptive use  Pain control  Patient experiencing 12 second and a 5-second arrest seen on the telemetry monitoring  -2/10 start apixiban load with plan to continue for at least 3 months

## 2025-02-09 NOTE — PROGRESS NOTES
Bedside shift report received from TIKI Renee. Patient A&Ox4, in bed resting, noted pale, c/o left sided chest pain. See MAR. Bed in lowest, locked position with call light and belongings within reach. Fall precautions reviewed with patient. Partner at bedside. Patient updated on POC and agreeable.

## 2025-02-09 NOTE — PROGRESS NOTES
[facility] Medicine Daily Progress Note    Date of Service  2/9/2025    Chief Complaint  Courtney Gayle is a 24 y.o. female admitted 2/8/2025 with chest pain.    Hospital Course  Courtney Gayle is a 24 y.o. female with past medical history of anxiety and hypertension who presented 2/8/2025 with chest pain and found to have bilateral pulmonary embolism.     Patient states that approximately 3 days ago she developed left sided chest pain, described as a stitch in her side.  She noted that she was having pain on inspiration.  She was elected to be seen at urgent care and was sent to the emergency department for further evaluation.  Patient states no family history of clotting, however her mother is adopted.  Patient states that approximately month ago she did travel by car to [place] stopping every 2-3 hours to walk her dog.  Patient denies any leg or calf pain and there is no swelling or redness.     In the emergency department, CBC demonstrates elevated WBC of 14.8, likely reactive however will check procalcitonin.  CHEM panel unremarkable.  Troponin negative.  D-dimer elevated at 3.64.  CT/CTA chest demonstrates positive for bilateral pulmonary emboli, probable pulmonary infarct left lower lobe, minimal opacification posterior right lower lobe could also be a pulmonary infarct.  Chest x-ray demonstrates minimal left basilar pulmonary opacification could potentially indicate developing pneumonitis or pneumonia versus atelectasis.  EKG demonstrates normal sinus rhythm with no ST segment changes noted.     Patient seen and examined this morning.  I discussed the patient in more depth her diagnosis of pulmonary embolisms in addition to the pulmonary infarct secondary to.  Discussed with her potential cause of contraceptive and that she would need follow-up for a different contraceptive method.  Discussed with the patient how she had a 12-second arrest in addition to 5-second arrest on the telemetry.  Educated  patient that we will keep her overnight due to the concern of the arrests and potential obstructive shock state.  Awaiting echocardiogram.    Interval Problem Update  Overnight patient had a 12-second arrest of a symptomatic with a syncopal episode.  She additionally had a 5-second arrest that she was asymptomatic for.  Patient currently on oxygen.  -Plan of care: Echocardiogram ordered and pending results.  Continue weight-based Lovenox and transition to DOAC at discharge.  -Disposition: Patient be transferred as inpatient status for further management.    I have discussed this patient's plan of care and discharge plan at IDT rounds today with Case Management, Nursing, Nursing leadership, and other members of the IDT team.    Consultants/Specialty  None    Code Status  Full Code    Disposition  The patient is not medically cleared for discharge to home or a post-acute facility.  Anticipate discharge to: home with close outpatient follow-up    I have placed the appropriate orders for post-discharge needs.    Review of Systems  Review of Systems   Constitutional:  Negative for chills, fever and malaise/fatigue.   HENT:  Negative for congestion and sore throat.    Eyes:  Negative for blurred vision and double vision.   Respiratory:  Negative for cough and shortness of breath.    Cardiovascular:  Positive for chest pain. Negative for palpitations and leg swelling.   Gastrointestinal:  Negative for abdominal pain, nausea and vomiting.   Genitourinary:  Negative for dysuria and flank pain.   Musculoskeletal:  Negative for falls and myalgias.   Skin:  Negative for itching and rash.   Neurological:  Negative for dizziness, weakness and headaches.   Psychiatric/Behavioral:  Negative for depression and suicidal ideas.         Physical Exam  Temp:  [35.9 °C (96.6 °F)-37.5 °C (99.5 °F)] 37.4 °C (99.4 °F)  Pulse:  [] 109  Resp:  [14-28] 16  BP: ()/(51-70) 91/55  SpO2:  [89 %-99 %] 96 %    Physical  Exam  Constitutional:       Appearance: Normal appearance.   HENT:      Head: Normocephalic and atraumatic.      Nose: Nose normal.      Mouth/Throat:      Mouth: Mucous membranes are moist.   Eyes:      Pupils: Pupils are equal, round, and reactive to light.   Cardiovascular:      Rate and Rhythm: Normal rate and regular rhythm.      Pulses: Normal pulses.      Heart sounds: Normal heart sounds.   Pulmonary:      Effort: Pulmonary effort is normal.      Breath sounds: Normal breath sounds.   Abdominal:      General: Bowel sounds are normal.      Palpations: Abdomen is soft.   Musculoskeletal:         General: Normal range of motion.      Cervical back: Normal range of motion.   Skin:     General: Skin is warm and dry.   Neurological:      General: No focal deficit present.      Mental Status: She is alert. Mental status is at baseline.   Psychiatric:         Mood and Affect: Mood normal.         Behavior: Behavior normal.         Fluids    Intake/Output Summary (Last 24 hours) at 2/9/2025 0957  Last data filed at 2/8/2025 2042  Gross per 24 hour   Intake 50 ml   Output 100 ml   Net -50 ml        Laboratory  Recent Labs     02/08/25  1446 02/09/25  0154   WBC 14.8* 10.5   RBC 4.68 4.31   HEMOGLOBIN 14.3 13.2   HEMATOCRIT 42.0 39.0   MCV 89.7 90.5   MCH 30.6 30.6   MCHC 34.0 33.8   RDW 40.3 40.8   PLATELETCT 236 228   MPV 9.0 9.2     Recent Labs     02/08/25  1446 02/09/25  0154   SODIUM 138 140   POTASSIUM 4.4 4.1   CHLORIDE 105 108   CO2 21 22   GLUCOSE 88 130*   BUN 9 8   CREATININE 0.84 0.75   CALCIUM 9.4 8.7                   Imaging  US-EXTREMITY VENOUS LOWER BILAT   Final Result      CT-CTA CHEST PULMONARY ARTERY W/ RECONS   Final Result      1.  Exam is positive for bilateral pulmonary emboli.      2.  Probable pulmonary infarct left lower lobe.      3.  Minimal opacification posterior right lower lobe could also be a pulmonary infarct.      These findings were discussed with ANNABELLA BROWNING on 02/08/2025.       DX-CHEST-PORTABLE (1 VIEW)   Final Result         Minimal left basilar pulmonary opacification could potentially indicate developing pneumonitis or pneumonia versus atelectasis.      EC-ECHOCARDIOGRAM COMPLETE W/O CONT    (Results Pending)        Assessment/Plan  * Pulmonary embolism, bilateral (HCC)- (present on admission)  Assessment & Plan  Without evidence of right heart strain on CT  Initiate therapeutic Lovenox, can likely transition to DOAC tomorrow following echocardiogram and DVT study  Echocardiogram ordered pending results   Bilateral lower extremity duplex negative for DVT  Factor testing ordered  Likely related to oral contraceptive use  Pain control  Patient experiencing 12 second and a 5-second arrest seen on the telemetry monitoring.         VTE prophylaxis:    therapeutic anticoagulation with weight-based lovenox BID, 1 mg/kg      I have performed a physical exam and reviewed and updated ROS and Plan today (2/9/2025). In review of yesterday's note (2/8/2025), there are no changes except as documented above.      ICourtney A.P.R.N. performed a substantiated portion of the service face-to-face with same patient on the same date of service INDEPENDENTLY FROM THE MD ON ASSESSMENT, EXAMINATION, AND DISCUSSION IN PLAN OF CARE FOR 19 MINUTES.  I was personally involved in reviewing and conducting the medical decision making, including the information as described below:

## 2025-02-09 NOTE — HOSPITAL COURSE
Courtney Gayle is a 24 y.o. female with past medical history of anxiety and hypertension who presented 2/8/2025 with left-sided chest pain.  She states approximately 1 month ago, she traveled by car to [place] stopping every 2-3 hours.  She denied having any leg or calf pain or any swelling or redness.  CTA demonstrated bilateral pulmonary emboli, probable pulmonary infarct left lower lobe. PE was initially treated with weight based enoxaparin and was later transitioned to apixaban.

## 2025-02-09 NOTE — PROGRESS NOTES
At 1931, received call from monitor room that pt is currently having a 12 sec arrest. Pt's partner reports patient fainting while lying in bed. Upon assessing pt, pt pale, nauseous, diaphoretic, reports dizziness, A&Ox4. VS /65, , RR 15, 97% on RA. Called YULI Osman at 1934 and notified of situation. Received verbal confirmation from YULI Joyce and order for 1L bolus.

## 2025-02-09 NOTE — PROGRESS NOTES
4 Eyes Skin Assessment Completed by TIKI Renee and TIKI Alves.    Head WDL  Ears WDL  Nose WDL  Mouth WDL  Neck WDL  Breast/Chest WDL  Shoulder Blades WDL  Spine WDL  (R) Arm/Elbow/Hand WDL  (L) Arm/Elbow/Hand WDL  Abdomen WDL  Groin WDL  Scrotum/Coccyx/Buttocks WDL  (R) Leg WDL  (L) Leg WDL  (R) Heel/Foot/Toe WDL  (L) Heel/Foot/Toe WDL          Devices In Places Blood Pressure Cuff and Pulse Ox      Interventions In Place N/A    Possible Skin Injury No    Pictures Uploaded Into Epic N/A  Wound Consult Placed N/A  RN Wound Prevention Protocol Ordered No

## 2025-02-09 NOTE — PROGRESS NOTES
Pt had fears of taking oxycodone as she felt she had a reaction to it when she had her pauses during the night. YULI and MD discussed and re-adjusted her MAR to remove the oxycodone and add the tramadol back on. Pt was given a dose by charge RN per MAR and pt would like it to begin working before she gets up to transfer to T. TIKI Linares in T was updated.

## 2025-02-09 NOTE — H&P
Hospital Medicine History & Physical Note    Date of Service  2/8/2025    Primary Care Physician  Ani Siddiqui P.A.-C.    Code Status  Full Code    Chief Complaint  Chief Complaint   Patient presents with    Chest Pain     Sharp L side pain began this morning, 6/10 and 8/10 when laying, worse with deep resp, no coughing, no resp or cardiac hx, hx HTN       History of Presenting Illness  Courtney Gayle is a 24 y.o. female who presented 2/8/2025 with chest pain.    This is a healthy 24-year-old female who presents emergency department with complaints of left-sided chest pain, found to have bilateral pulmonary embolism.    Patient seen and examined at bedside.  Patient states that approximately 3 days ago she developed left sided chest pain, described as a stitch in her side.  She noted that she was having pain on inspiration.  She was elected to be seen at urgent care and was sent to the emergency department for further evaluation.  Patient states no family history of clotting, however her mother is adopted.  Patient states that approximately month ago she did travel by car to [place] stopping every 2-3 hours to walk her dog.  Patient denies any leg or calf pain and there is no swelling or redness.    In the emergency department, CBC demonstrates elevated WBC of 14.8, likely reactive however will check procalcitonin.  CHEM panel unremarkable.  Troponin negative.  D-dimer elevated at 3.64.  CT/CTA chest demonstrates positive for bilateral pulmonary emboli, probable pulmonary infarct left lower lobe, minimal opacification posterior right lower lobe could also be a pulmonary infarct.  Chest x-ray demonstrates minimal left basilar pulmonary opacification could potentially indicate developing pneumonitis or pneumonia versus atelectasis.  EKG demonstrates normal sinus rhythm with no ST segment changes noted.    Patient will be admitted to the observation unit and monitored on telemetry.  She was initiated on  therapeutic Lovenox in the ED which we will continue pending echocardiogram and bilateral lower extremity duplex rule out DVT.  Additionally, I have ordered factor testing.  We will provide pain management.  Follow-up a.m. labs.    I did have long conversation with patient regarding diagnosis, anticoagulation, lifestyle changes, and long-term outcome.  Discussed oral anticoagulation as well as [facility].  Also discussed that she should follow-up as soon as possible with her gynecologist for different contraceptive.    Addendum 2000: I was contacted by primary RN, patient suffered 12-second arrest on telemetry with possible syncopal episode.  To bedside to assess patient.  Patient had recently received oxycodone and subsequently became nauseous and was vomiting.  This is likely a vasovagal response.  However, out of abundance of caution I have repeated an EKG and electrolytes and provided a 1 L NS bolus.      I discussed the plan of care with patient, family, and ERP .    Review of Systems  Review of Systems   Constitutional:  Positive for malaise/fatigue.   HENT: Negative.     Eyes: Negative.    Respiratory:  Positive for shortness of breath. Negative for cough.    Cardiovascular:  Positive for chest pain and orthopnea. Negative for palpitations and leg swelling.   Gastrointestinal: Negative.    Genitourinary: Negative.    Musculoskeletal: Negative.    Skin: Negative.    Neurological: Negative.    Endo/Heme/Allergies: Negative.    Psychiatric/Behavioral: Negative.     All other systems reviewed and are negative.      Past Medical History   has a past medical history of Anxiety and Hypertension.    Surgical History   has no past surgical history on file.     Family History  family history includes Alcohol abuse in her father and maternal grandfather; Psychiatric Illness in her sister.   Family history reviewed with patient. There is no family history that is pertinent to the chief complaint.      Social History   reports that she has never smoked. She has never used smokeless tobacco. She reports current alcohol use. She reports that she does not use drugs.    Allergies  No Known Allergies    Medications  Prior to Admission Medications   Prescriptions Last Dose Informant Patient Reported? Taking?   multivitamin Tab 2/6/2025 Patient Yes Yes   Sig: Take 1 Tablet by mouth every day.   norethindrone-ethinyl estradiol-iron (MICROGESTIN FE 1.5/30) 1.5-30 MG-MCG tablet 2/8/2025 at  4:00 AM Patient No Yes   Sig: Take 1 Tablet by mouth every day.      Facility-Administered Medications: None       Physical Exam  Temp:  [36.3 °C (97.4 °F)] 36.3 °C (97.4 °F)  Pulse:  [] 97  Resp:  [14-18] 14  BP: (104-118)/(55-70) 104/59  SpO2:  [95 %-99 %] 96 %  Blood Pressure: 104/59   Temperature: 36.3 °C (97.4 °F)   Pulse: 97   Respiration: 14   Pulse Oximetry: 96 %       Physical Exam  Constitutional:       Appearance: Normal appearance.   HENT:      Head: Normocephalic.      Nose: Nose normal.      Mouth/Throat:      Mouth: Mucous membranes are moist.   Eyes:      Extraocular Movements: Extraocular movements intact.      Pupils: Pupils are equal, round, and reactive to light.   Cardiovascular:      Rate and Rhythm: Normal rate and regular rhythm.      Pulses: Normal pulses.      Heart sounds: Normal heart sounds.   Pulmonary:      Effort: Pulmonary effort is normal.      Breath sounds: Normal breath sounds.   Abdominal:      General: Abdomen is flat. Bowel sounds are normal.      Palpations: Abdomen is soft.   Musculoskeletal:         General: Normal range of motion.      Cervical back: Normal range of motion.   Skin:     General: Skin is warm.      Capillary Refill: Capillary refill takes less than 2 seconds.   Neurological:      General: No focal deficit present.      Mental Status: She is alert and oriented to person, place, and time.   Psychiatric:         Mood and Affect: Mood normal.         Behavior: Behavior  normal.         Thought Content: Thought content normal.         Judgment: Judgment normal.         Laboratory:  Recent Labs     02/08/25  1446   WBC 14.8*   RBC 4.68   HEMOGLOBIN 14.3   HEMATOCRIT 42.0   MCV 89.7   MCH 30.6   MCHC 34.0   RDW 40.3   PLATELETCT 236   MPV 9.0     Recent Labs     02/08/25  1446   SODIUM 138   POTASSIUM 4.4   CHLORIDE 105   CO2 21   GLUCOSE 88   BUN 9   CREATININE 0.84   CALCIUM 9.4     Recent Labs     02/08/25  1446   ALTSGPT 54*   ASTSGOT 37   ALKPHOSPHAT 64   TBILIRUBIN 0.4   GLUCOSE 88         Recent Labs     02/08/25  1446   NTPROBNP 77         Recent Labs     02/08/25  1446   TROPONINT <6       Imaging:  CT-CTA CHEST PULMONARY ARTERY W/ RECONS   Final Result      1.  Exam is positive for bilateral pulmonary emboli.      2.  Probable pulmonary infarct left lower lobe.      3.  Minimal opacification posterior right lower lobe could also be a pulmonary infarct.      These findings were discussed with ANNABELLA BROWNING on 02/08/2025.      DX-CHEST-PORTABLE (1 VIEW)   Final Result         Minimal left basilar pulmonary opacification could potentially indicate developing pneumonitis or pneumonia versus atelectasis.      EC-ECHOCARDIOGRAM COMPLETE W/O CONT    (Results Pending)   US-EXTREMITY VENOUS LOWER BILAT    (Results Pending)       X-Ray:  I have personally reviewed the images and compared with prior images.  EKG:  I have personally reviewed the images and compared with prior images.    Assessment/Plan:  Justification for Admission Status  I anticipate this patient is appropriate for observation status at this time because PE    Patient will need a Telemetry bed on MEDICAL service .  The need is secondary to PE.    * Pulmonary embolism, bilateral (HCC)- (present on admission)  Assessment & Plan  Without evidence of right heart strain on CT  Initiate therapeutic Lovenox, can likely transition to DOAC tomorrow following echocardiogram and DVT study  Echocardiogram and bilateral lower  extremity duplex  Factor testing ordered  Likely related to oral contraceptive use  Pain control        VTE prophylaxis: therapeutic anticoagulation with Lovenox

## 2025-02-09 NOTE — PROGRESS NOTES
Monitor summary:  SR/ST Rate   5 sec sinus arrest followed by 12 sec sinus arrest at 1926  0.12/0.01/0.32

## 2025-02-09 NOTE — PROGRESS NOTES
Telephone report given to TIKI Linares. Echo at bedside now. Will transfer pt following echo completion

## 2025-02-09 NOTE — PROGRESS NOTES
0540: Pt reports morphine dose x2 moderately effective for pain control. 5/10--reports managed. RR 20, No dyspnea and no additional O2 required at this time.     0625. Pt crying in pain 9/10. States pain has moved lower to under left breast, sharp stabbing. Tramadol ineffective. YULI Rock notified for additional pain intervention.     0643: Administered one time dose dilaudid. Pt soon reporting significant improvement, states pain now controlled at this time.

## 2025-02-10 PROBLEM — R73.01 FASTING HYPERGLYCEMIA: Status: ACTIVE | Noted: 2025-02-10

## 2025-02-10 LAB
ANION GAP SERPL CALC-SCNC: 11 MMOL/L (ref 7–16)
BASOPHILS # BLD AUTO: 0.2 % (ref 0–1.8)
BASOPHILS # BLD: 0.03 K/UL (ref 0–0.12)
BUN SERPL-MCNC: 9 MG/DL (ref 8–22)
CALCIUM SERPL-MCNC: 9.2 MG/DL (ref 8.5–10.5)
CHLORIDE SERPL-SCNC: 101 MMOL/L (ref 96–112)
CO2 SERPL-SCNC: 23 MMOL/L (ref 20–33)
CREAT SERPL-MCNC: 0.66 MG/DL (ref 0.5–1.4)
EOSINOPHIL # BLD AUTO: 0.01 K/UL (ref 0–0.51)
EOSINOPHIL NFR BLD: 0.1 % (ref 0–6.9)
ERYTHROCYTE [DISTWIDTH] IN BLOOD BY AUTOMATED COUNT: 39.9 FL (ref 35.9–50)
EST. AVERAGE GLUCOSE BLD GHB EST-MCNC: 103 MG/DL
GFR SERPLBLD CREATININE-BSD FMLA CKD-EPI: 126 ML/MIN/1.73 M 2
GLUCOSE SERPL-MCNC: 103 MG/DL (ref 65–99)
HBA1C MFR BLD: 5.2 % (ref 4–5.6)
HCT VFR BLD AUTO: 38.5 % (ref 37–47)
HGB BLD-MCNC: 13.2 G/DL (ref 12–16)
IMM GRANULOCYTES # BLD AUTO: 0.06 K/UL (ref 0–0.11)
IMM GRANULOCYTES NFR BLD AUTO: 0.5 % (ref 0–0.9)
LYMPHOCYTES # BLD AUTO: 1.3 K/UL (ref 1–4.8)
LYMPHOCYTES NFR BLD: 10 % (ref 22–41)
MCH RBC QN AUTO: 30.3 PG (ref 27–33)
MCHC RBC AUTO-ENTMCNC: 34.3 G/DL (ref 32.2–35.5)
MCV RBC AUTO: 88.5 FL (ref 81.4–97.8)
MONOCYTES # BLD AUTO: 0.67 K/UL (ref 0–0.85)
MONOCYTES NFR BLD AUTO: 5.1 % (ref 0–13.4)
NEUTROPHILS # BLD AUTO: 10.97 K/UL (ref 1.82–7.42)
NEUTROPHILS NFR BLD: 84.1 % (ref 44–72)
NRBC # BLD AUTO: 0 K/UL
NRBC BLD-RTO: 0 /100 WBC (ref 0–0.2)
PLATELET # BLD AUTO: 221 K/UL (ref 164–446)
PMV BLD AUTO: 8.7 FL (ref 9–12.9)
POTASSIUM SERPL-SCNC: 3.7 MMOL/L (ref 3.6–5.5)
RBC # BLD AUTO: 4.35 M/UL (ref 4.2–5.4)
SODIUM SERPL-SCNC: 135 MMOL/L (ref 135–145)
WBC # BLD AUTO: 13 K/UL (ref 4.8–10.8)

## 2025-02-10 PROCEDURE — 700102 HCHG RX REV CODE 250 W/ 637 OVERRIDE(OP)

## 2025-02-10 PROCEDURE — A9270 NON-COVERED ITEM OR SERVICE: HCPCS

## 2025-02-10 PROCEDURE — 99232 SBSQ HOSP IP/OBS MODERATE 35: CPT | Performed by: STUDENT IN AN ORGANIZED HEALTH CARE EDUCATION/TRAINING PROGRAM

## 2025-02-10 PROCEDURE — 700111 HCHG RX REV CODE 636 W/ 250 OVERRIDE (IP): Mod: JZ

## 2025-02-10 PROCEDURE — 85025 COMPLETE CBC W/AUTO DIFF WBC: CPT

## 2025-02-10 PROCEDURE — 80048 BASIC METABOLIC PNL TOTAL CA: CPT

## 2025-02-10 PROCEDURE — 770020 HCHG ROOM/CARE - TELE (206)

## 2025-02-10 PROCEDURE — 83036 HEMOGLOBIN GLYCOSYLATED A1C: CPT

## 2025-02-10 RX ORDER — PROCHLORPERAZINE EDISYLATE 5 MG/ML
5 INJECTION INTRAMUSCULAR; INTRAVENOUS EVERY 4 HOURS PRN
Status: DISCONTINUED | OUTPATIENT
Start: 2025-02-10 | End: 2025-02-11 | Stop reason: HOSPADM

## 2025-02-10 RX ORDER — ACETAMINOPHEN 500 MG
1000 TABLET ORAL EVERY 6 HOURS PRN
Status: DISCONTINUED | OUTPATIENT
Start: 2025-02-10 | End: 2025-02-11 | Stop reason: HOSPADM

## 2025-02-10 RX ORDER — TRAMADOL HYDROCHLORIDE 50 MG/1
50 TABLET ORAL EVERY 4 HOURS PRN
Status: DISCONTINUED | OUTPATIENT
Start: 2025-02-10 | End: 2025-02-11 | Stop reason: HOSPADM

## 2025-02-10 RX ADMIN — ACETAMINOPHEN 1000 MG: 500 TABLET ORAL at 06:03

## 2025-02-10 RX ADMIN — PROCHLORPERAZINE EDISYLATE 10 MG: 5 INJECTION INTRAMUSCULAR; INTRAVENOUS at 11:43

## 2025-02-10 RX ADMIN — POLYETHYLENE GLYCOL 3350 1 PACKET: 17 POWDER, FOR SOLUTION ORAL at 06:03

## 2025-02-10 RX ADMIN — APIXABAN 10 MG: 5 TABLET, FILM COATED ORAL at 17:50

## 2025-02-10 RX ADMIN — ENOXAPARIN SODIUM 80 MG: 100 INJECTION SUBCUTANEOUS at 05:20

## 2025-02-10 RX ADMIN — PROCHLORPERAZINE EDISYLATE 5 MG: 5 INJECTION INTRAMUSCULAR; INTRAVENOUS at 22:22

## 2025-02-10 RX ADMIN — SENNOSIDES AND DOCUSATE SODIUM 2 TABLET: 50; 8.6 TABLET ORAL at 17:51

## 2025-02-10 RX ADMIN — TRAMADOL HYDROCHLORIDE 50 MG: 50 TABLET, COATED ORAL at 07:29

## 2025-02-10 RX ADMIN — TRAMADOL HYDROCHLORIDE 50 MG: 50 TABLET, COATED ORAL at 00:37

## 2025-02-10 ASSESSMENT — ENCOUNTER SYMPTOMS
PSYCHIATRIC NEGATIVE: 1
GASTROINTESTINAL NEGATIVE: 1
NEUROLOGICAL NEGATIVE: 1
CARDIOVASCULAR NEGATIVE: 1
RESPIRATORY NEGATIVE: 1
CONSTITUTIONAL NEGATIVE: 1
EYES NEGATIVE: 1

## 2025-02-10 ASSESSMENT — FIBROSIS 4 INDEX: FIB4 SCORE: 0.51

## 2025-02-10 ASSESSMENT — PAIN DESCRIPTION - PAIN TYPE
TYPE: ACUTE PAIN

## 2025-02-10 NOTE — CARE PLAN
The patient is Stable - Low risk of patient condition declining or worsening    Shift Goals  Clinical Goals: echo, tele monitoring, pain relief  Patient Goals: rest, feel better  Family Goals: no family present    Progress made toward(s) clinical / shift goals:        Problem: Pain - Standard  Goal: Alleviation of pain or a reduction in pain to the patient’s comfort goal  Outcome: Progressing     Problem: Knowledge Deficit - Standard  Goal: Patient and family/care givers will demonstrate understanding of plan of care, disease process/condition, diagnostic tests and medications  Outcome: Progressing     Problem: Respiratory  Goal: Patient will achieve/maintain optimum respiratory ventilation and gas exchange  Outcome: Progressing     Problem: Discharge Barriers/Planning  Goal: Patient's continuum of care needs are met  Outcome: Progressing       Patient is not progressing towards the following goals:

## 2025-02-10 NOTE — CARE PLAN
The patient is Stable - Low risk of patient condition declining or worsening    Shift Goals  Clinical Goals: monitor pain  Patient Goals: rest, comfort  Family Goals: comfort    Progress made toward(s) clinical / shift goals:    Problem: Pain - Standard  Goal: Alleviation of pain or a reduction in pain to the patient’s comfort goal  Description: Target End Date:  Prior to discharge or change in level of care    Document on Vitals flowsheet    1.  Document pain using the appropriate pain scale per order or unit policy  2.  Educate and implement non-pharmacologic comfort measures (i.e. relaxation, distraction, massage, cold/heat therapy, etc.)  3.  Pain management medications as ordered  4.  Reassess pain after pain med administration per policy  5.  If opiods administered assess patient's response to pain medication is appropriate per POSS sedation scale  6.  Follow pain management plan developed in collaboration with patient and interdisciplinary team (including palliative care or pain specialists if applicable)  Outcome: Progressing     Problem: Knowledge Deficit - Standard  Goal: Patient and family/care givers will demonstrate understanding of plan of care, disease process/condition, diagnostic tests and medications  Description: Target End Date:  1-3 days or as soon as patient condition allows    Document in Patient Education    1.  Patient and family/caregiver oriented to unit, equipment, visitation policy and means for communicating concern  2.  Complete/review Learning Assessment  3.  Assess knowledge level of disease process/condition, treatment plan, diagnostic tests and medications  4.  Explain disease process/condition, treatment plan, diagnostic tests and medications  Outcome: Progressing       Patient is not progressing towards the following goals:

## 2025-02-10 NOTE — PROGRESS NOTES
4 Eyes Skin Assessment Completed by TIKI Linares and TIKI Chapman.    Head WDL  Ears WDL  Nose WDL  Mouth WDL  Neck WDL  Breast/Chest WDL  Shoulder Blades WDL  Spine WDL  (R) Arm/Elbow/Hand WDL  (L) Arm/Elbow/Hand WDL  Abdomen WDL  Groin WDL  Scrotum/Coccyx/Buttocks WDL  (R) Leg WDL  (L) Leg WDL  (R) Heel/Foot/Toe WDL  (L) Heel/Foot/Toe WDL          Devices In Places Tele Box, Blood Pressure Cuff, and Pulse Ox      Interventions In Place N/A    Possible Skin Injury No    Pictures Uploaded Into Epic N/A  Wound Consult Placed N/A  RN Wound Prevention Protocol Ordered No

## 2025-02-10 NOTE — PROGRESS NOTES
Pt complaining that her middle chest was spasming and cramping. Pt offered pain meds but ultimately decided that she wanted to get up to walk to bathroom. Requested stool softeners since pt taking narcotics. Updated hospitalist. Pt then called writer back too room multiple times stating that she was now having cramps and spasms in her left chest and abd area. Pt requested something different for pain. Muscle relaxer requested from hospitalist. Pt then stated that it didn't help and wanted something else for pain. PRN tramadol given but also wanted to speak to hospitalist about pain and pain regamine. Hospitalist notified, but when came to room, pt sleeping and resting comfortably.

## 2025-02-10 NOTE — PROGRESS NOTES
[facility] Daily Progress Note    Date of Service  2/10/2025    [facility] Team: [facility]   Attending: Janice Grant M.d.  Senior Resident: Dr. Farhana Tinsley  Intern:  Dr. Anil Diaz   Contact Number: [phone]    Chief Complaint  Courtney Gayle is a 24 y.o. female admitted 2/8/2025 with   Chief Complaint   Patient presents with    Chest Pain     Sharp L side pain began this morning, 6/10 and 8/10 when laying, worse with deep resp, no coughing, no resp or cardiac hx, hx HTN        Hospital Course  Courtney Gayle is a 24 y.o. female with past medical history of anxiety and hypertension who presented 2/8/2025 with left-sided chest pain.  She states approximately 1 month ago, she traveled by car to [place] stopping every 2-3 hours.  She denied having any leg or calf pain or any swelling or redness.  CTA demonstrated bilateral pulmonary emboli, probable pulmonary infarct left lower lobe. PE was initially treated with weight based enoxaparin and was later transitioned to apixaban.       Interval Problem Update  No any acute events overnight. Patient was seen and examined bedside.     I have discussed this patient's plan of care and discharge plan at IDT rounds today with Case Management, Nursing, Nursing leadership, and other members of the IDT team.    Consultants/Specialty  None     Code Status  Full Code    Disposition  The patient is not medically cleared for discharge to home or a post-acute facility.      I have placed the appropriate orders for post-discharge needs.    Review of Systems  Review of Systems   Constitutional: Negative.    HENT: Negative.     Eyes: Negative.    Respiratory: Negative.     Cardiovascular: Negative.    Gastrointestinal: Negative.    Genitourinary: Negative.    Skin: Negative.    Neurological: Negative.    Endo/Heme/Allergies: Negative.    Psychiatric/Behavioral: Negative.          Physical Exam  Temp:  [36.4 °C (97.5 °F)-36.9 °C (98.4 °F)] 36.4 °C (97.5  °F)  Pulse:  [] 105  Resp:  [16-18] 16  BP: (103-109)/(65-75) 103/68  SpO2:  [91 %-96 %] 94 %    Physical Exam  Constitutional:       Appearance: Normal appearance.   HENT:      Head: Normocephalic and atraumatic.      Nose: Nose normal.      Mouth/Throat:      Mouth: Mucous membranes are moist.   Eyes:      Pupils: Pupils are equal, round, and reactive to light.   Cardiovascular:      Rate and Rhythm: Normal rate and regular rhythm.      Pulses: Normal pulses.      Heart sounds: Normal heart sounds.   Pulmonary:      Effort: Pulmonary effort is normal.      Breath sounds: Normal breath sounds.   Abdominal:      General: Bowel sounds are normal.      Palpations: Abdomen is soft.   Musculoskeletal:      Cervical back: Normal range of motion.   Skin:     General: Skin is warm.      Capillary Refill: Capillary refill takes less than 2 seconds.   Neurological:      General: No focal deficit present.      Mental Status: She is alert.   Psychiatric:         Mood and Affect: Mood normal.         Fluids    Intake/Output Summary (Last 24 hours) at 2/10/2025 1646  Last data filed at 2/10/2025 1200  Gross per 24 hour   Intake 200 ml   Output 0 ml   Net 200 ml       Laboratory  Recent Labs     02/08/25  1446 02/09/25  0154 02/10/25  1416   WBC 14.8* 10.5 13.0*   RBC 4.68 4.31 4.35   HEMOGLOBIN 14.3 13.2 13.2   HEMATOCRIT 42.0 39.0 38.5   MCV 89.7 90.5 88.5   MCH 30.6 30.6 30.3   MCHC 34.0 33.8 34.3   RDW 40.3 40.8 39.9   PLATELETCT 236 228 221   MPV 9.0 9.2 8.7*     Recent Labs     02/08/25  1446 02/09/25  0154 02/10/25  1416   SODIUM 138 140 135   POTASSIUM 4.4 4.1 3.7   CHLORIDE 105 108 101   CO2 21 22 23   GLUCOSE 88 130* 103*   BUN 9 8 9   CREATININE 0.84 0.75 0.66   CALCIUM 9.4 8.7 9.2                   Imaging  EC-ECHOCARDIOGRAM COMPLETE W/O CONT   Final Result      US-EXTREMITY VENOUS LOWER BILAT   Final Result      CT-CTA CHEST PULMONARY ARTERY W/ RECONS   Final Result      1.  Exam is positive for bilateral  pulmonary emboli.      2.  Probable pulmonary infarct left lower lobe.      3.  Minimal opacification posterior right lower lobe could also be a pulmonary infarct.      These findings were discussed with ANNABELLA BROWNING on 02/08/2025.      DX-CHEST-PORTABLE (1 VIEW)   Final Result         Minimal left basilar pulmonary opacification could potentially indicate developing pneumonitis or pneumonia versus atelectasis.           Assessment/Plan  Problem Representation:    * Pulmonary embolism, bilateral (HCC)- (present on admission)  Assessment & Plan  No evidence of right heart strain on imaging   Started on weight based lovenox  Echocardiogram ordered pending results   Bilateral lower extremity duplex negative for DVT  Factor testing- protein S ordered  Likely related to oral contraceptive use  Pain control  Patient experiencing 12 second and a 5-second arrest seen on the telemetry monitoring  -2/10 start apixiban load with plan to continue for at least 3 months     Fasting hyperglycemia  Assessment & Plan  Pending a1c         VTE prophylaxis: therapeutic anticoagulation with Apixaban    I have performed a physical exam and reviewed and updated ROS and Plan today (2/10/2025). In review of yesterday's note (2/9/2025), there are no changes except as documented above.

## 2025-02-11 ENCOUNTER — PHARMACY VISIT (OUTPATIENT)
Dept: PHARMACY | Facility: MEDICAL CENTER | Age: 25
End: 2025-02-11
Payer: COMMERCIAL

## 2025-02-11 VITALS
DIASTOLIC BLOOD PRESSURE: 75 MMHG | HEART RATE: 100 BPM | RESPIRATION RATE: 18 BRPM | BODY MASS INDEX: 32.56 KG/M2 | TEMPERATURE: 97.9 F | WEIGHT: 190.7 LBS | HEIGHT: 64 IN | OXYGEN SATURATION: 95 % | SYSTOLIC BLOOD PRESSURE: 110 MMHG

## 2025-02-11 PROBLEM — R73.01 FASTING HYPERGLYCEMIA: Status: RESOLVED | Noted: 2025-02-10 | Resolved: 2025-02-11

## 2025-02-11 LAB
ERYTHROCYTE [DISTWIDTH] IN BLOOD BY AUTOMATED COUNT: 39.5 FL (ref 35.9–50)
HCT VFR BLD AUTO: 37.9 % (ref 37–47)
HGB BLD-MCNC: 13.3 G/DL (ref 12–16)
MCH RBC QN AUTO: 31.3 PG (ref 27–33)
MCHC RBC AUTO-ENTMCNC: 35.1 G/DL (ref 32.2–35.5)
MCV RBC AUTO: 89.2 FL (ref 81.4–97.8)
PLATELET # BLD AUTO: 222 K/UL (ref 164–446)
PMV BLD AUTO: 8.7 FL (ref 9–12.9)
PROT C ACT/NOR PPP: 124 % (ref 83–168)
PROT S ACT/NOR PPP: 88 % (ref 57–131)
RBC # BLD AUTO: 4.25 M/UL (ref 4.2–5.4)
WBC # BLD AUTO: 12.7 K/UL (ref 4.8–10.8)

## 2025-02-11 PROCEDURE — 700102 HCHG RX REV CODE 250 W/ 637 OVERRIDE(OP)

## 2025-02-11 PROCEDURE — 85027 COMPLETE CBC AUTOMATED: CPT

## 2025-02-11 PROCEDURE — RXMED WILLOW AMBULATORY MEDICATION CHARGE

## 2025-02-11 PROCEDURE — 700111 HCHG RX REV CODE 636 W/ 250 OVERRIDE (IP): Mod: JZ

## 2025-02-11 PROCEDURE — A9270 NON-COVERED ITEM OR SERVICE: HCPCS

## 2025-02-11 PROCEDURE — 99239 HOSP IP/OBS DSCHRG MGMT >30: CPT | Mod: GC | Performed by: INTERNAL MEDICINE

## 2025-02-11 RX ADMIN — PROCHLORPERAZINE EDISYLATE 5 MG: 5 INJECTION INTRAMUSCULAR; INTRAVENOUS at 07:46

## 2025-02-11 RX ADMIN — APIXABAN 10 MG: 5 TABLET, FILM COATED ORAL at 04:59

## 2025-02-11 ASSESSMENT — PATIENT HEALTH QUESTIONNAIRE - PHQ9
SUM OF ALL RESPONSES TO PHQ9 QUESTIONS 1 AND 2: 0
2. FEELING DOWN, DEPRESSED, IRRITABLE, OR HOPELESS: NOT AT ALL
1. LITTLE INTEREST OR PLEASURE IN DOING THINGS: NOT AT ALL

## 2025-02-11 ASSESSMENT — PAIN DESCRIPTION - PAIN TYPE: TYPE: ACUTE PAIN

## 2025-02-11 ASSESSMENT — FIBROSIS 4 INDEX: FIB4 SCORE: 0.52

## 2025-02-11 NOTE — PROGRESS NOTES
Pt sent to discharge lounge via wheelchair with mother at bedside. IV and tele box removed. All belongings with pt at time of discharge from the unit.

## 2025-02-11 NOTE — PROGRESS NOTES
Patient arrived to discharge lounge.  Discussed discharge paperwork and medications with patient and family. Answered all questions. No home meds to return, M2B given to patient. Patient ambulated out with family, personal belongings in hand.

## 2025-02-11 NOTE — CARE PLAN
The patient is Stable - Low risk of patient condition declining or worsening    Shift Goals  Clinical Goals: safety, N/V management, o2 monitoring  Patient Goals: rest  Family Goals: rest    Progress made toward(s) clinical / shift goals:    Problem: Knowledge Deficit - Standard  Goal: Patient and family/care givers will demonstrate understanding of plan of care, disease process/condition, diagnostic tests and medications  Description: Target End Date:  1-3 days or as soon as patient condition allows    Document in Patient Education    1.  Patient and family/caregiver oriented to unit, equipment, visitation policy and means for communicating concern  2.  Complete/review Learning Assessment  3.  Assess knowledge level of disease process/condition, treatment plan, diagnostic tests and medications  4.  Explain disease process/condition, treatment plan, diagnostic tests and medications  Outcome: Progressing  Note: POC discussed with pt, all questions answered at this time.       Problem: Respiratory  Goal: Patient will achieve/maintain optimum respiratory ventilation and gas exchange  Description: Target End Date:  Prior to discharge or change in level of care    Document on Assessment flowsheet    1.  Assess and monitor rate, rhythm, depth and effort of respiration  2.  Breath sounds assessed qshift and/or as needed  3.  Assess O2 saturation, administer/titrate oxygen as ordered  4.  Position patient for maximum ventilatory efficiency  5.  Turn, cough, and deep breath with splinting to improve effectiveness  6.  Collaborate with RT to administer medication/treatments per order  7.  Encourage use of incentive spirometer and encourage patient to cough after use and utilize splinting techniques if applicable  8.  Airway suctioning  9.  Monitor sputum production for changes in color, consistency and frequency  10. Perform frequent oral hygiene  11. Alternate physical activity with rest periods  Outcome:  Progressing  Flowsheets  Taken 2/11/2025 0425 by Christal Felix R.N.  Deep Breathe and Cough: Performs Correctly  Taken 2/10/2025 2345 by Myesha Osborne, C.N.A.  O2 Delivery Device: Nasal Cannula  Note: Head of bead discussed, education about o2 monitoring equipment offered, o2 finger probe replaced when damaged, all other questions answered at this time       Patient is not progressing towards the following goals:

## 2025-02-11 NOTE — DISCHARGE SUMMARY
[facility] Internal Medicine Discharge Summary    Attending: Dr. Maryjo Concepcion  Senior Resident: Dr. Elliott  Intern:  Dr. Diaz  Contact Number: [phone]    CHIEF COMPLAINT ON ADMISSION  Chief Complaint   Patient presents with    Chest Pain     Sharp L side pain began this morning, 6/10 and 8/10 when laying, worse with deep resp, no coughing, no resp or cardiac hx, hx HTN       Reason for Admission  chest pain     Admission Date  2/8/2025    CODE STATUS  Full Code    HPI & HOSPITAL COURSE  Courtney Gayle is a 24 y.o. female with past medical history of anxiety and hypertension who presented 2/8/2025 with left-sided chest pain.  She states approximately 1 month ago, she traveled by car to [place] stopping every 2-3 hours.  She denied having any leg or calf pain or any swelling or redness.  CTA demonstrated bilateral pulmonary emboli, probable pulmonary infarct left lower lobe. PE was initially treated with weight based enoxaparin and was later transitioned to apixaban.  Patient was discharged without oxygen requirements. Was advised to discontinue use of birth control as this was the only identifiable risk factor     Therefore, she is discharged in good and stable condition to home with close outpatient follow-up.    The patient met 2-midnight criteria for an inpatient stay at the time of discharge.    Discharge Date  2/11/2025    Physical Exam on Day of Discharge  Physical Exam  Vitals reviewed.   Constitutional:       General: She is not in acute distress.     Appearance: She is not ill-appearing.   Eyes:      Conjunctiva/sclera: Conjunctivae normal.   Cardiovascular:      Rate and Rhythm: Tachycardia present.   Pulmonary:      Effort: Pulmonary effort is normal. No respiratory distress.   Abdominal:      Palpations: Abdomen is soft.      Tenderness: There is no abdominal tenderness.   Musculoskeletal:      Right lower leg: No edema.      Left lower leg: No edema.   Skin:     General: Skin is warm and dry.    Neurological:      General: No focal deficit present.   Psychiatric:         Mood and Affect: Mood normal.         FOLLOW UP ITEMS POST DISCHARGE  Oxygen requirements  Follow up Protein C & S, consider further work up for hypercoagulability   Contraceptives      DISCHARGE DIAGNOSES  Principal Problem:    Pulmonary embolism, bilateral (HCC) (POA: Yes)  Resolved Problems:    Fasting hyperglycemia (POA: Unknown)      FOLLOW UP  No future appointments.  Ani Siddiqui P.A.-C.  [address] [place] [address] [phone]    Follow up        MEDICATIONS ON DISCHARGE     Medication List        START taking these medications        Instructions   * Eliquis 5 MG Tabs  Generic drug: apixaban   Take 2 Tablets by mouth 2 times a day for 6 days.  Dose: 10 mg     * Eliquis 5 MG Tabs  Start taking on: February 17, 2025  Generic drug: apixaban   Take 1 Tablet by mouth 2 times a day.  Dose: 5 mg           * This list has 2 medication(s) that are the same as other medications prescribed for you. Read the directions carefully, and ask your doctor or other care provider to review them with you.                CONTINUE taking these medications        Instructions   multivitamin Tabs   Take 1 Tablet by mouth every day.  Dose: 1 Tablet            STOP taking these medications      norethindrone-ethinyl estradiol-iron 1.5-30 MG-MCG tablet  Commonly known as: Microgestin FE 1.5/30              Allergies  No Known Allergies    DIET  Orders Placed This Encounter   Procedures    Diet Order Diet: Regular     Standing Status:   Standing     Number of Occurrences:   1     Order Specific Question:   Diet:     Answer:   Regular [1]       ACTIVITY  As tolerated.  Weight bearing as tolerated    CONSULTATIONS  None    PROCEDURES  None    LABORATORY  Lab Results   Component Value Date    SODIUM 135 02/10/2025    POTASSIUM 3.7 02/10/2025    CHLORIDE 101 02/10/2025    CO2 23 02/10/2025    GLUCOSE 103 (H) 02/10/2025    BUN 9 02/10/2025     CREATININE 0.66 02/10/2025        Lab Results   Component Value Date    WBC 12.7 (H) 02/11/2025    HEMOGLOBIN 13.3 02/11/2025    HEMATOCRIT 37.9 02/11/2025    PLATELETCT 222 02/11/2025        Total time of the discharge process exceeds 45 minutes.

## 2025-02-11 NOTE — DISCHARGE PLANNING
Work letter sent to her email. [email or web address]     02/11/2025    To Whom It May Concern:       The purpose of this letter is to advise you that Courtney Gayle was admitted on 02/08/2025 to the Medical unit at [facility]. The patients date of discharge is 02/11/2025     Due to these unforeseen circumstances.  Our team expresses your appreciation in advance for your understanding, compassion, and support toward her during these emotionally challenging circumstances.  Please don’t hesitate to call me with any questions.    Sincerely,    Anil Diaz M.D.       Electronically signed     Anil Diaz M.D.     [facility]  [phone]

## 2025-02-12 NOTE — Clinical Note
REFERRAL APPROVAL NOTICE         Sent on February 12, 2025                   Courtney Gayle  [address]  [address]  [place] [address]                   Dear Ms. Gayle,    After a careful review of the medical information and benefit coverage, [facility] has processed your referral. See below for additional details.    If applicable, you must be actively enrolled with your insurance for coverage of the authorized service. If you have any questions regarding your coverage, please contact your insurance directly.    REFERRAL INFORMATION   Referral #:  [id number]  Referred-To Provider    Referred-By Provider:  Endocrinology    Ani Siddiqui P.A.-C.   [facility]      [address] [place] [address]  [phone] [address] [place] [address]  [phone]    Referral Start Date:  02/06/2025  Referral End Date:   02/06/2026             SCHEDULING  If you do not already have an appointment, please call [phone] to make an appointment.     MORE INFORMATION  If you do not already have a [employer] account, sign up at: [email or web address]  You can access your medical information, make appointments, see lab results, billing information, and more.  If you have questions regarding this referral, please contact  the [facility] Referrals department at:             [phone]. Monday - Friday 8:00AM - 5:00PM.     Sincerely,    [facility]

## 2025-02-13 LAB — F5 P.R506Q BLD/T QL: NEGATIVE

## 2025-02-21 ENCOUNTER — OFFICE VISIT (OUTPATIENT)
Dept: MEDICAL GROUP | Facility: IMAGING CENTER | Age: 25
End: 2025-02-21
Payer: COMMERCIAL

## 2025-02-21 VITALS
OXYGEN SATURATION: 97 % | TEMPERATURE: 97.9 F | BODY MASS INDEX: 32.44 KG/M2 | SYSTOLIC BLOOD PRESSURE: 110 MMHG | DIASTOLIC BLOOD PRESSURE: 78 MMHG | HEIGHT: 64 IN | WEIGHT: 190 LBS | RESPIRATION RATE: 14 BRPM | HEART RATE: 79 BPM

## 2025-02-21 DIAGNOSIS — I26.99 PULMONARY EMBOLISM, BILATERAL (HCC): ICD-10-CM

## 2025-02-21 DIAGNOSIS — D72.829 LEUKOCYTOSIS, UNSPECIFIED TYPE: ICD-10-CM

## 2025-02-21 DIAGNOSIS — Z09 HOSPITAL DISCHARGE FOLLOW-UP: Primary | ICD-10-CM

## 2025-02-21 ASSESSMENT — FIBROSIS 4 INDEX: FIB4 SCORE: 0.52

## 2025-02-21 NOTE — PROGRESS NOTES
Subjective:     Courtney Gayle is a 24 y.o. female who presents for Hospital Follow-up.    Transitional Care Management    Verbal consent was acquired by the patient to use [employer] ambient listening note generation during this visit Yes      History of Present Illness    Hospitalization  The patient was admitted to the hospital on 02/08/2025 for a duration of three days due to bilateral pulmonary emboli. She had undertaken a road trip to [place] in January 2025, during which she spent approximately 8 to 9 hours in the car with intermittent stops. This journey was repeated twice, once in late December 2024 and again in January 2025. She experienced symptoms of chest pain and dyspnea, prompting the visit to the emergency room. A computed tomography (CT) scan that revealed emboli in both lungs. During her hospitalization, she received supplemental oxygen therapy but currently does not require it. A duplex ultrasound of her lower extremities did not reveal any deep vein thrombosis (DVT), and an echocardiogram showed no evidence of cardiac damage. Initially, she was treated with enoxaparin injections, which were later transitioned to apixaban tablets. The patient was previously on oral contraceptive pills but has since discontinued their use and has been advised against hormonal contraception. She has not yet received the Nexplanon implant. She denies any history of smoking or vaping.  She has a family history of cerebrovascular accidents and thromboembolic events on her paternal side. Her mother has had to wear a cardiac monitor due to arrhythmias. Factor V Leiden, protein C and protein S were unremarkable. She denies any current chest pain, dyspnea, tachycardia,  or palpitations.                     Current medicines (including reconciliation performed today)  Current Outpatient Medications   Medication Sig Dispense Refill    apixaban (ELIQUIS) 5mg Tab Take 1 Tablet by mouth 2 times a day. 60 Tablet 1     "multivitamin Tab Take 1 Tablet by mouth every day.       No current facility-administered medications for this visit.       Allergies:   Patient has no known allergies.    Social History     Tobacco Use    Smoking status: Never    Smokeless tobacco: Never   Vaping Use    Vaping status: Never Used   Substance Use Topics    Alcohol use: Yes     Comment: Occasional    Drug use: Never       ROS:  See HPI    Objective:     Vitals:    02/21/25 1404   BP: 110/78   BP Location: Right arm   Patient Position: Sitting   BP Cuff Size: Large adult   Pulse: 79   Resp: 14   Temp: 36.6 °C (97.9 °F)   TempSrc: Temporal   SpO2: 97%   Weight: 86.2 kg (190 lb)   Height: 1.613 m (5' 3.5\")     Body mass index is 33.13 kg/m².    Physical Exam:  Physical Exam  Constitutional:       General: She is not in acute distress.     Appearance: Normal appearance.   HENT:      Head: Normocephalic and atraumatic.   Eyes:      General: No scleral icterus.        Right eye: No discharge.         Left eye: No discharge.   Neck:      Thyroid: No thyroid mass or thyromegaly.   Cardiovascular:      Rate and Rhythm: Normal rate and regular rhythm.      Heart sounds: Normal heart sounds. No murmur heard.  Pulmonary:      Effort: Pulmonary effort is normal.      Breath sounds: Normal breath sounds. No wheezing.   Abdominal:      General: Bowel sounds are normal. There is no distension.      Palpations: Abdomen is soft. There is no mass.      Tenderness: There is no abdominal tenderness.   Musculoskeletal:         General: No swelling. Normal range of motion.      Cervical back: Normal range of motion and neck supple.   Lymphadenopathy:      Cervical: No cervical adenopathy.   Skin:     General: Skin is warm and dry.   Neurological:      General: No focal deficit present.      Mental Status: She is alert and oriented to person, place, and time.      Gait: Gait normal.   Psychiatric:         Mood and Affect: Mood normal.         Behavior: Behavior normal.       "   Thought Content: Thought content normal.         Judgment: Judgment normal.          Assessment and Plan:   1. Hospital discharge follow-up  - Chart and discharge summary were reviewed.   - Hospitalization and results reviewed with patient.   - Medications reviewed including instructions regarding high risk medications, dosing and side effects.  - Recommended Services: Referral to hematology  - Advance directive/POLST on file?  No     2. Pulmonary embolism, bilateral (HCC)  Newly diagnosed during hospitalization.  Unknown etiology but the oral contraceptive could have been the culprit.  Patient has discontinued the oral contraceptive.  Recommend avoiding oral contraceptives.  Recommend avoiding smoking and vaping. Recommend avoiding prolonged immobility.  Factor V Leiden, protein C , and protein S were unremarkable.  Will check APTT, INR,  functional antithrombin III, and Factor II. It is recommended to continue with apixaban 5 mg twice daily for at least 3 more months.  Recommend to avoid NSAIDs while taking apixaban.  Recommend to repeat CTA in 3 months.  Referred to hematology for further workup.  Consider Zio patch to rule out cardiac arrhythmia.  Recommend follow-up in 3 months or sooner if needed.  Strict ED precautions given.  - APTT; Future  - AT-III FUNCTIONAL; Future  - FACTOR II DNA ANALYSIS; Future  - Prothrombin Time; Future  - CT-CTA CHEST PULMONARY ARTERY W/ RECONS; Future  - Referral to Hematology Oncology    3. Leukocytosis, unspecified type  Acute.  New finding during hospitalization.  Recommend to repeat CBC.  - CBC WITH DIFFERENTIAL; Future        Follow-up:Return in about 3 months (around 5/21/2025) for PE f/u.    Face-to-face transitional care management services with [facility] (today's visit is within 14 days post discharge & LACE+ score of 28-58) medical decision complexity were provided.       Please note that this dictation was created using voice recognition software. I have made every  reasonable attempt to correct obvious errors, but I expect that there are errors of grammar and possibly content that I did not discover before finalizing the note.

## 2025-02-26 NOTE — Clinical Note
REFERRAL APPROVAL NOTICE         Sent on February 26, 2025                   Courtney Gayle  [address]  [address]  [place] [address]                   Dear Ms. Gayle,    After a careful review of the medical information and benefit coverage, [facility] has processed your referral. See below for additional details.    If applicable, you must be actively enrolled with your insurance for coverage of the authorized service. If you have any questions regarding your coverage, please contact your insurance directly.    REFERRAL INFORMATION   Referral #:  [id number]  Referred-To Department    Referred-By Provider:  Hematology Oncology    Ani Siddiqui P.A.-C.   Oncology [facility]      [address] [place] [id number] [phone] [address]  [place] [address] [phone]    Referral Start Date:  02/21/2025  Referral End Date:   02/21/2026           SCHEDULING  If you do not already have an appointment, please call [phone] to make an appointment.   MORE INFORMATION  As a reminder, [facility] ownership has changed, meaning this location is now owned and operated by [facility]. As such, we want to clarify that our patients should expect to receive two separate bills for the services received at [facility] - one representing the [facility] facility fees as the owner of the establishment, and the other to represent the physician's services and subsequent fees. You can speak with your insurance carrier for a pricing estimate by calling the customer service number on the back of your card and ask about charges for a hospital outpatient visit.  If you do not already have a [employer] account, sign up at: [email or web address]  You can access your medical information, make appointments, see lab results, billing information, and more.  If you have questions regarding this referral, please contact  the [facility] Referrals department at:             [phone]. Monday  - Friday 7:30AM - 5:00PM.      Sincerely,  [facility]

## 2025-02-27 ENCOUNTER — APPOINTMENT (OUTPATIENT)
Dept: RADIOLOGY | Facility: MEDICAL CENTER | Age: 25
End: 2025-02-27
Attending: EMERGENCY MEDICINE
Payer: COMMERCIAL

## 2025-02-27 ENCOUNTER — APPOINTMENT (OUTPATIENT)
Dept: URGENT CARE | Facility: CLINIC | Age: 25
End: 2025-02-27
Payer: COMMERCIAL

## 2025-02-27 ENCOUNTER — HOSPITAL ENCOUNTER (EMERGENCY)
Facility: MEDICAL CENTER | Age: 25
End: 2025-02-27
Attending: EMERGENCY MEDICINE
Payer: COMMERCIAL

## 2025-02-27 VITALS
OXYGEN SATURATION: 98 % | DIASTOLIC BLOOD PRESSURE: 60 MMHG | TEMPERATURE: 97.5 F | BODY MASS INDEX: 33.59 KG/M2 | HEIGHT: 63 IN | HEART RATE: 80 BPM | SYSTOLIC BLOOD PRESSURE: 104 MMHG | RESPIRATION RATE: 16 BRPM | WEIGHT: 189.6 LBS

## 2025-02-27 DIAGNOSIS — Z86.711 HISTORY OF PULMONARY EMBOLUS (PE): ICD-10-CM

## 2025-02-27 DIAGNOSIS — R07.9 CHEST PAIN, UNSPECIFIED TYPE: ICD-10-CM

## 2025-02-27 LAB
ALBUMIN SERPL BCP-MCNC: 4.1 G/DL (ref 3.2–4.9)
ALBUMIN/GLOB SERPL: 1.5 G/DL
ALP SERPL-CCNC: 75 U/L (ref 30–99)
ALT SERPL-CCNC: 11 U/L (ref 2–50)
ANION GAP SERPL CALC-SCNC: 15 MMOL/L (ref 7–16)
APPEARANCE UR: CLEAR
APTT PPP: 29.9 SEC (ref 24.7–36)
AST SERPL-CCNC: 16 U/L (ref 12–45)
BASOPHILS # BLD AUTO: 0.5 % (ref 0–1.8)
BASOPHILS # BLD: 0.07 K/UL (ref 0–0.12)
BILIRUB SERPL-MCNC: 0.2 MG/DL (ref 0.1–1.5)
BILIRUB UR QL STRIP.AUTO: NEGATIVE
BUN SERPL-MCNC: 10 MG/DL (ref 8–22)
CALCIUM ALBUM COR SERPL-MCNC: 9.2 MG/DL (ref 8.5–10.5)
CALCIUM SERPL-MCNC: 9.3 MG/DL (ref 8.5–10.5)
CHLORIDE SERPL-SCNC: 104 MMOL/L (ref 96–112)
CO2 SERPL-SCNC: 21 MMOL/L (ref 20–33)
COLOR UR: YELLOW
CREAT SERPL-MCNC: 0.7 MG/DL (ref 0.5–1.4)
EOSINOPHIL # BLD AUTO: 0.1 K/UL (ref 0–0.51)
EOSINOPHIL NFR BLD: 0.8 % (ref 0–6.9)
ERYTHROCYTE [DISTWIDTH] IN BLOOD BY AUTOMATED COUNT: 40.4 FL (ref 35.9–50)
GFR SERPLBLD CREATININE-BSD FMLA CKD-EPI: 124 ML/MIN/1.73 M 2
GLOBULIN SER CALC-MCNC: 2.7 G/DL (ref 1.9–3.5)
GLUCOSE SERPL-MCNC: 81 MG/DL (ref 65–99)
GLUCOSE UR STRIP.AUTO-MCNC: NEGATIVE MG/DL
HCG SERPL QL: NEGATIVE
HCT VFR BLD AUTO: 43.7 % (ref 37–47)
HGB BLD-MCNC: 14.4 G/DL (ref 12–16)
IMM GRANULOCYTES # BLD AUTO: 0.05 K/UL (ref 0–0.11)
IMM GRANULOCYTES NFR BLD AUTO: 0.4 % (ref 0–0.9)
INR PPP: 1.24 (ref 0.87–1.13)
KETONES UR STRIP.AUTO-MCNC: 15 MG/DL
LEUKOCYTE ESTERASE UR QL STRIP.AUTO: NEGATIVE
LYMPHOCYTES # BLD AUTO: 3.72 K/UL (ref 1–4.8)
LYMPHOCYTES NFR BLD: 28.5 % (ref 22–41)
MCH RBC QN AUTO: 29.9 PG (ref 27–33)
MCHC RBC AUTO-ENTMCNC: 33 G/DL (ref 32.2–35.5)
MCV RBC AUTO: 90.7 FL (ref 81.4–97.8)
MICRO URNS: ABNORMAL
MONOCYTES # BLD AUTO: 0.66 K/UL (ref 0–0.85)
MONOCYTES NFR BLD AUTO: 5 % (ref 0–13.4)
NEUTROPHILS # BLD AUTO: 8.47 K/UL (ref 1.82–7.42)
NEUTROPHILS NFR BLD: 64.8 % (ref 44–72)
NITRITE UR QL STRIP.AUTO: NEGATIVE
NRBC # BLD AUTO: 0 K/UL
NRBC BLD-RTO: 0 /100 WBC (ref 0–0.2)
PH UR STRIP.AUTO: 5.5 [PH] (ref 5–8)
PLATELET # BLD AUTO: 288 K/UL (ref 164–446)
PMV BLD AUTO: 8.9 FL (ref 9–12.9)
POTASSIUM SERPL-SCNC: 3.7 MMOL/L (ref 3.6–5.5)
PROT SERPL-MCNC: 6.8 G/DL (ref 6–8.2)
PROT UR QL STRIP: NEGATIVE MG/DL
PROTHROMBIN TIME: 15.6 SEC (ref 12–14.6)
RBC # BLD AUTO: 4.82 M/UL (ref 4.2–5.4)
RBC UR QL AUTO: NEGATIVE
SODIUM SERPL-SCNC: 140 MMOL/L (ref 135–145)
SP GR UR STRIP.AUTO: 1.02
UROBILINOGEN UR STRIP.AUTO-MCNC: 0.2 EU/DL
WBC # BLD AUTO: 13.1 K/UL (ref 4.8–10.8)

## 2025-02-27 PROCEDURE — 700117 HCHG RX CONTRAST REV CODE 255: Performed by: EMERGENCY MEDICINE

## 2025-02-27 PROCEDURE — 84703 CHORIONIC GONADOTROPIN ASSAY: CPT

## 2025-02-27 PROCEDURE — 85730 THROMBOPLASTIN TIME PARTIAL: CPT

## 2025-02-27 PROCEDURE — 85025 COMPLETE CBC W/AUTO DIFF WBC: CPT

## 2025-02-27 PROCEDURE — 36415 COLL VENOUS BLD VENIPUNCTURE: CPT

## 2025-02-27 PROCEDURE — 85610 PROTHROMBIN TIME: CPT

## 2025-02-27 PROCEDURE — 99283 EMERGENCY DEPT VISIT LOW MDM: CPT

## 2025-02-27 PROCEDURE — 71275 CT ANGIOGRAPHY CHEST: CPT

## 2025-02-27 PROCEDURE — 81003 URINALYSIS AUTO W/O SCOPE: CPT

## 2025-02-27 PROCEDURE — 80053 COMPREHEN METABOLIC PANEL: CPT

## 2025-02-27 RX ADMIN — IOHEXOL 66 ML: 350 INJECTION, SOLUTION INTRAVENOUS at 19:00

## 2025-02-27 ASSESSMENT — FIBROSIS 4 INDEX: FIB4 SCORE: 0.52

## 2025-02-27 NOTE — ED TRIAGE NOTES
Courtney Gayle  24 y.o. female  Chief Complaint   Patient presents with    Flank Pain     Left sided flank and mid back pain. Pt states she was recently diagnosed with bilateral P.E'S on 2.8. Pt states the discomfort feels similar.        Vitals:    02/27/25 1439   BP: 111/74   Pulse: 82   Resp: 18   Temp: 36.4 °C (97.5 °F)   SpO2: 96%       Patient educated on triage process and encouraged to alert staff of any changes in condition.

## 2025-02-28 NOTE — DISCHARGE INSTRUCTIONS
Your tests today show no dangerous cause for your pain.  The blood clot that you are being treated for is improving and getting smaller.  Please ensure you continue to take your blood thinner, rest and stay well-hydrated.  Follow-up with primary care but seek more immediate medical attention for any new or worsening chest pain, difficulty breathing, passing out or other concerns

## 2025-02-28 NOTE — ED PROVIDER NOTES
"ED Provider Note      ED PHYSICIAN NOTE    CHIEF COMPLAINT  Chief Complaint   Patient presents with    Flank Pain     Left sided flank and mid back pain. Pt states she was recently diagnosed with bilateral P.E'S on 2.8. Pt states the discomfort feels similar.        EXTERNAL RECORDS REVIEWED  Inpatient Notes patient was admitted February 8 for bilateral pulmonary embolus, with pulmonary infarct, negative DVTs, echocardiogram normal, discharged on eliquis    HPI/ROS  LIMITATION TO HISTORY   Select: : None  OUTSIDE HISTORIAN(S):  none    Courtney Gayle is a 24 y.o. female who presents with left sided chest and back pain.  Patient has recent diagnosis of pulmonary embolus has been compliant with her Eliquis.  Yesterday she began experiencing some sharp pain to her left side moving towards the front.  It is worse with taking deep breaths and certain movements.  She reports no shortness of breath, no leg pain or swelling, no fevers or chills or cough or congestion.  No lightheadedness or syncope.    She was on birth control pills prior but has stopped these    PAST MEDICAL HISTORY  Past Medical History:   Diagnosis Date    Anxiety     Hypertension        SOCIAL HISTORY  Social History     Tobacco Use    Smoking status: Never    Smokeless tobacco: Never   Vaping Use    Vaping status: Never Used   Substance Use Topics    Alcohol use: Yes     Comment: Occasional    Drug use: Never       CURRENT MEDICATIONS  Home Medications    **Home medications have not yet been reviewed for this encounter**       Audit from Redirected Encounters    **Home medications have not yet been reviewed for this encounter**         ALLERGIES  No Known Allergies    PHYSICAL EXAM  VITAL SIGNS: /56   Pulse 74   Temp 36.4 °C (97.5 °F) (Temporal)   Resp 16   Ht 1.6 m (5' 3\")   Wt 86 kg (189 lb 9.5 oz)   LMP 02/10/2025 (Exact Date)   SpO2 96%   BMI 33.59 kg/m²    Constitutional: Awake and alert   HENT: Normal inspection, no signs of " trauma  Eyes: Normal inspection, Pupils equal, non-icteric  Neck: Grossly normal range of motion. No stridor  Cardiovascular: Regular rate and rhythm, no murmurs.  Symmetric peripheral pulses at radial   Thorax & Lungs: No respiratory distress, No wheezing, No rales, No rhonchi, tenderness to the left lateral posterior areas of thorax  Abdomen:  soft, non-distended, nontender, no mass  Skin: No obvious rash. Warm. Dry.   Back: No tenderness, No CVA tenderness.   Extremities: No cyanosis, no edema  Neurologic: AO3, Grossly normal,  Psychiatric: Normal affect for situation        DIAGNOSTIC STUDIES / PROCEDURES  LABS/EKG  Results for orders placed or performed during the hospital encounter of 02/27/25   CBC WITH DIFFERENTIAL    Collection Time: 02/27/25  4:28 PM   Result Value Ref Range    WBC 13.1 (H) 4.8 - 10.8 K/uL    RBC 4.82 4.20 - 5.40 M/uL    Hemoglobin 14.4 12.0 - 16.0 g/dL    Hematocrit 43.7 37.0 - 47.0 %    MCV 90.7 81.4 - 97.8 fL    MCH 29.9 27.0 - 33.0 pg    MCHC 33.0 32.2 - 35.5 g/dL    RDW 40.4 35.9 - 50.0 fL    Platelet Count 288 164 - 446 K/uL    MPV 8.9 (L) 9.0 - 12.9 fL    Neutrophils-Polys 64.80 44.00 - 72.00 %    Lymphocytes 28.50 22.00 - 41.00 %    Monocytes 5.00 0.00 - 13.40 %    Eosinophils 0.80 0.00 - 6.90 %    Basophils 0.50 0.00 - 1.80 %    Immature Granulocytes 0.40 0.00 - 0.90 %    Nucleated RBC 0.00 0.00 - 0.20 /100 WBC    Neutrophils (Absolute) 8.47 (H) 1.82 - 7.42 K/uL    Lymphs (Absolute) 3.72 1.00 - 4.80 K/uL    Monos (Absolute) 0.66 0.00 - 0.85 K/uL    Eos (Absolute) 0.10 0.00 - 0.51 K/uL    Baso (Absolute) 0.07 0.00 - 0.12 K/uL    Immature Granulocytes (abs) 0.05 0.00 - 0.11 K/uL    NRBC (Absolute) 0.00 K/uL   COMP METABOLIC PANEL    Collection Time: 02/27/25  4:28 PM   Result Value Ref Range    Sodium 140 135 - 145 mmol/L    Potassium 3.7 3.6 - 5.5 mmol/L    Chloride 104 96 - 112 mmol/L    Co2 21 20 - 33 mmol/L    Anion Gap 15.0 7.0 - 16.0    Glucose 81 65 - 99 mg/dL    Bun 10 8 -  22 mg/dL    Creatinine 0.70 0.50 - 1.40 mg/dL    Calcium 9.3 8.5 - 10.5 mg/dL    Correct Calcium 9.2 8.5 - 10.5 mg/dL    AST(SGOT) 16 12 - 45 U/L    ALT(SGPT) 11 2 - 50 U/L    Alkaline Phosphatase 75 30 - 99 U/L    Total Bilirubin 0.2 0.1 - 1.5 mg/dL    Albumin 4.1 3.2 - 4.9 g/dL    Total Protein 6.8 6.0 - 8.2 g/dL    Globulin 2.7 1.9 - 3.5 g/dL    A-G Ratio 1.5 g/dL   APTT    Collection Time: 02/27/25  4:28 PM   Result Value Ref Range    APTT 29.9 24.7 - 36.0 sec   PROTHROMBIN TIME (INR)    Collection Time: 02/27/25  4:28 PM   Result Value Ref Range    PT 15.6 (H) 12.0 - 14.6 sec    INR 1.24 (H) 0.87 - 1.13   HCG QUAL SERUM    Collection Time: 02/27/25  4:28 PM   Result Value Ref Range    Beta-Hcg Qualitative Serum Negative Negative   ESTIMATED GFR    Collection Time: 02/27/25  4:28 PM   Result Value Ref Range    GFR (CKD-EPI) 124 >60 mL/min/1.73 m 2   URINALYSIS    Collection Time: 02/27/25  4:51 PM    Specimen: Urine   Result Value Ref Range    Color Yellow     Character Clear     Specific Gravity 1.018 <1.035    Ph 5.5 5.0 - 8.0    Glucose Negative Negative mg/dL    Ketones 15 (A) Negative mg/dL    Protein Negative Negative mg/dL    Bilirubin Negative Negative    Urobilinogen, Urine 0.2 <=1.0 EU/dL    Nitrite Negative Negative    Leukocyte Esterase Negative Negative    Occult Blood Negative Negative    Micro Urine Req see below          RADIOLOGY  I have independently interpreted the diagnostic imaging associated with this visit and am waiting the final reading from the radiologist.   My preliminary interpretation is as follows: no edema    CT-CTA CHEST PULMONARY ARTERY W/ RECONS   Final Result      1.  Small filling defect within anterior basal LEFT lower lobe pulmonary branch vessel, likely residual from prior pulmonary embolus.  Clot or significantly improved from prior.  No definite new pulmonary embolus.   2.  Probable evolving LEFT lower lobe pulmonary infarct.   3.  No evidence for RV strain.   4.  Trace  LEFT pleural fluid.                  COURSE & MEDICAL DECISION MAKING    INITIAL ASSESSMENT, COURSE AND PLAN  Care Narrative: 4:06 PM  Patient presents with chest/back pain.  Certainly consideration for pulmonary embolus given her recent history, additional consideration for musculoskeletal pain, renal insufficiency, urinary tract infection, electrolyte or metabolic derangement.  I have ordered for diagnostic labs, CTA.  She declines need for pain medication    7:05 PM  Patient is reevaluated.  She is updated on all results she is comfortable with the discharge plan      Interventions  Medications   iohexol (OMNIPAQUE) 350 mg/mL (IV) (66 mL Intravenous Given 2/27/25 1900)              PROBLEM LIST  This is a pleasant 24-year-old female presenting with some left sided    # Chest pain.  At this point no findings of emergent pathology.  She does have some very small residual clot from her PE although much improved from prior.  No findings of new clots or progression of clot.  She does have pre-existing pulmonary infarct as well.  This could potentially be more musculoskeletal as well.  She is comfortable taking Tylenol.  No findings of infectious process electrolyte or metabolic derangement, urinalysis is negative as well      DISPOSITION AND DISCUSSIONS  Barriers to care at this time, including but not limited to:  none .     Prescription drugs considered and/or prescribed:   Considered opiate prescription, but nonnarcotic analgesic is most appropriate  Prescribed   New Prescriptions    No medications on file        The patient will return for new or worsening symptoms and is stable at the time of discharge.    The patient is referred to a primary physician for blood pressure management, diabetic screening, and for all other preventative health concerns.        DISPOSITION:  Patient will be discharged home in stable condition.    FOLLOW UP:  Ani Siddiqui P.A.-C.  [place]  [id number]  [phone]      As needed      OUTPATIENT MEDICATIONS:  New Prescriptions    No medications on file         FINAL DIAGNOSIS  1. Chest pain, unspecified type        2. History of pulmonary embolus (PE)               This dictation was created using voice recognition software. The accuracy of the dictation is limited to the abilities of the software. I expect there may be some errors of grammar and possibly content. The nursing notes were reviewed and certain aspects of this information were incorporated into this note.    Electronically signed by: Ferdinand Galvez M.D., 2/27/2025

## 2025-02-28 NOTE — ED NOTES
Up ambulated to the restroom to provide urine sample. Instructed how to collect clean catch urine.

## 2025-02-28 NOTE — ED NOTES
Discharge instructions given to pt including follow up with pcp or returning if no improvement of symptoms or to return if worse. Questions answered by RN. Denies any new complaints. Discharged w/stable vitals and able to ambulate to the lobby with steady gait home by self.

## 2025-02-28 NOTE — ED NOTES
Piv placed, blood collected specimen sent to lab. Call light within reach. Instructed to call staff for assistance.

## 2025-03-13 ENCOUNTER — HOSPITAL ENCOUNTER (OUTPATIENT)
Dept: HEMATOLOGY ONCOLOGY | Facility: MEDICAL CENTER | Age: 25
End: 2025-03-13
Attending: STUDENT IN AN ORGANIZED HEALTH CARE EDUCATION/TRAINING PROGRAM
Payer: COMMERCIAL

## 2025-03-13 VITALS
OXYGEN SATURATION: 97 % | SYSTOLIC BLOOD PRESSURE: 118 MMHG | DIASTOLIC BLOOD PRESSURE: 62 MMHG | WEIGHT: 190.26 LBS | HEART RATE: 82 BPM | BODY MASS INDEX: 33.71 KG/M2 | HEIGHT: 63 IN | TEMPERATURE: 97.7 F

## 2025-03-13 DIAGNOSIS — I26.99 PULMONARY EMBOLISM, BILATERAL (HCC): ICD-10-CM

## 2025-03-13 PROCEDURE — 99205 OFFICE O/P NEW HI 60 MIN: CPT | Performed by: STUDENT IN AN ORGANIZED HEALTH CARE EDUCATION/TRAINING PROGRAM

## 2025-03-13 PROCEDURE — 99212 OFFICE O/P EST SF 10 MIN: CPT | Performed by: STUDENT IN AN ORGANIZED HEALTH CARE EDUCATION/TRAINING PROGRAM

## 2025-03-13 ASSESSMENT — ENCOUNTER SYMPTOMS
TREMORS: 0
BLURRED VISION: 0
NAUSEA: 0
DEPRESSION: 0
BRUISES/BLEEDS EASILY: 0
SORE THROAT: 0
HEARTBURN: 0
NECK PAIN: 0
SENSORY CHANGE: 0
WEIGHT LOSS: 0
HEADACHES: 0
SPUTUM PRODUCTION: 0
TINGLING: 0
FOCAL WEAKNESS: 0
DIZZINESS: 0
ORTHOPNEA: 0
PALPITATIONS: 0
COUGH: 0
FEVER: 0
VOMITING: 0
MEMORY LOSS: 0
CHILLS: 0
WHEEZING: 0
SHORTNESS OF BREATH: 0
ABDOMINAL PAIN: 0

## 2025-03-13 ASSESSMENT — FIBROSIS 4 INDEX: FIB4 SCORE: 0.39

## 2025-03-13 NOTE — PROGRESS NOTES
Consult Note: Hematology/Oncology     Primary Care:  Ani Siddiqui P.A.-C.    No chief complaint on file.      Current Treatment: Eliquis 5mg BID    Prior Treatment: None    Subjective:   History of Presenting Illness:  Courtney Gayle is a 24 y.o. female who presents with a recent pulmonary embolism.      Patient reports that in February she was diagnosed with a pulmonary embolism.  She had a CTA which demonstrated  Bilateral pulmonary emboli, and a probable pulmonary infarct in the left lower lobe.  Her PE was treated initially with weight-based enoxaparin and transition to Eliquis.  She was discharged with room air.    She was advised at that time to discontinue her birth control.    In January she was traveling by car, to  from [place]. She did stop every several hours.       Past Medical History:   Diagnosis Date    Anxiety     Hypertension         No past surgical history on file.    Social History     Tobacco Use    Smoking status: Never    Smokeless tobacco: Never   Vaping Use    Vaping status: Never Used   Substance Use Topics    Alcohol use: Yes     Comment: Occasional    Drug use: Never        Family History   Problem Relation Age of Onset    Alcohol abuse Father     Alcohol abuse Maternal Grandfather     Psychiatric Illness Sister         Bipolar disorder II       No Known Allergies    Current Outpatient Medications   Medication Sig Dispense Refill    apixaban (ELIQUIS) 5mg Tab Take 1 Tablet by mouth 2 times a day. 60 Tablet 1    multivitamin Tab Take 1 Tablet by mouth every day.       No current facility-administered medications for this encounter.       Review of Systems   Constitutional:  Negative for chills, fever, malaise/fatigue and weight loss.   HENT:  Negative for congestion, ear pain, nosebleeds and sore throat.    Eyes:  Negative for blurred vision.   Respiratory:  Negative for cough, sputum production, shortness of breath and wheezing.    Cardiovascular:  Negative for chest pain,  palpitations, orthopnea and leg swelling.   Gastrointestinal:  Negative for abdominal pain, heartburn, nausea and vomiting.   Genitourinary:  Negative for dysuria, frequency and urgency.   Musculoskeletal:  Negative for neck pain.   Neurological:  Negative for dizziness, tingling, tremors, sensory change, focal weakness and headaches.   Endo/Heme/Allergies:  Does not bruise/bleed easily.   Psychiatric/Behavioral:  Negative for depression, memory loss and suicidal ideas.    All other systems reviewed and are negative.      Problem list, medications, and allergies reviewed by myself today in Epic.     Objective:   There were no vitals filed for this visit.    DESC; KARNOFSKY SCALE WITH ECOG EQUIVALENT: 90, Able to carry on normal activity; minor signs or symptoms of disease (ECOG equivalent 0)    DISTRESS LEVEL: no apparent distress    Physical Exam  Constitutional:       General: She is not in acute distress.     Appearance: Normal appearance. She is not ill-appearing.   HENT:      Head: Normocephalic and atraumatic.      Nose: Nose normal.      Mouth/Throat:      Mouth: Mucous membranes are moist.      Pharynx: No oropharyngeal exudate or posterior oropharyngeal erythema.   Eyes:      General: No scleral icterus.     Conjunctiva/sclera: Conjunctivae normal.      Pupils: Pupils are equal, round, and reactive to light.   Cardiovascular:      Rate and Rhythm: Normal rate and regular rhythm.      Pulses: Normal pulses.      Heart sounds: Normal heart sounds. No murmur heard.     No friction rub. No gallop.   Pulmonary:      Effort: Pulmonary effort is normal. No respiratory distress.      Breath sounds: Normal breath sounds. No stridor. No wheezing, rhonchi or rales.   Chest:      Chest wall: No tenderness.   Abdominal:      General: Abdomen is flat. Bowel sounds are normal. There is no distension.      Palpations: Abdomen is soft. There is no mass.      Tenderness: There is no abdominal tenderness. There is no  guarding.   Musculoskeletal:         General: No swelling, tenderness or deformity. Normal range of motion.      Cervical back: Normal range of motion and neck supple. No rigidity or tenderness.      Right lower leg: No edema.      Left lower leg: No edema.   Skin:     General: Skin is warm and dry.      Coloration: Skin is not jaundiced or pale.      Findings: No bruising, erythema or rash.   Neurological:      General: No focal deficit present.      Mental Status: She is alert and oriented to person, place, and time. Mental status is at baseline.      Sensory: No sensory deficit.      Motor: No weakness.      Coordination: Coordination normal.      Gait: Gait normal.   Psychiatric:         Mood and Affect: Mood normal.         Behavior: Behavior normal.         Thought Content: Thought content normal.         Judgment: Judgment normal.         Labs:   Most recent labs reviewed.  Please see the lab tab of chart review    Imaging:   Most recent images below have been independently reviewed by me.  Please see the imaging tab of chart review      Assessment/Plan:       Ms. Gayle is a 24-year-old female who presents today with a recurrent pulmonary embolism.  Currently on Eliquis 5 mg twice daily.    Today I discussed with the patient reasons for pulmonary emboli.  Will consist of prolonged sedentary state, hormonal birth control, smoking, obesity.    We discussed that she is on hormonal birth control and that would likely her trigger for pulmonary embolism.  We also discussed her BMI and discussed goals of getting her weight to further down.    We discussed with the American Society of hematology does not recommend a thrombophilia workup in the setting of a provoked blood clot.  Since her blood clot was likely due to hormonal birth control as well as obesity a thrombophilia workup is indicated.    I recommend 3 months of anticoagulation.  I will see her afterwards to discuss plans moving forward.    Plan  -Continue  Eliquis until end date of May 9, 2025  -Will see patient back shortly after     No follow-ups on file.     Any questions and concerns raised by the patient were addressed and answered. Patient denies any further questions.  Patient encouraged to call the office with any concerns or issues.     Essie Watkins M.D.  Hematology/Oncology

## 2025-03-15 NOTE — Clinical Note
REFERRAL APPROVAL NOTICE         Sent on March 14, 2025                   Courtney Gayle  [address]  [address]  [place] [facility] [place] [address]                   Dear Ms. Gayle,    After a careful review of the medical information and benefit coverage, [facility] has processed your referral. See below for additional details.    If applicable, you must be actively enrolled with your insurance for coverage of the authorized service. If you have any questions regarding your coverage, please contact your insurance directly.    REFERRAL INFORMATION   Referral #:  [id number]  Referred-To Department    Referred-By Provider:  Gynecology    Essie Watkins M.D.   [facility]      [address] [facility]  [address]  [facility] [address]  [phone] [address], [address]  [facility] [facility] [id number] [phone]    Referral Start Date:  03/13/2025  Referral End Date:   03/13/2026             SCHEDULING  If you do not already have an appointment, please call [phone] to make an appointment.     MORE INFORMATION  If you do not already have a [email or web address] account, sign up at: [email or web address]  You can access your medical information, make appointments, see lab results, billing information, and more.  If you have questions regarding this referral, please contact  the [facility] Referrals department at:             [phone]. Monday - Friday 8:00AM - 5:00PM.     Sincerely,    [facility]

## 2025-03-16 ENCOUNTER — PHARMACY VISIT (OUTPATIENT)
Dept: PHARMACY | Facility: MEDICAL CENTER | Age: 25
End: 2025-03-16
Payer: COMMERCIAL

## 2025-03-16 PROCEDURE — RXMED WILLOW AMBULATORY MEDICATION CHARGE

## 2025-04-14 ENCOUNTER — HOSPITAL ENCOUNTER (OUTPATIENT)
Dept: LAB | Facility: MEDICAL CENTER | Age: 25
End: 2025-04-14
Payer: COMMERCIAL

## 2025-04-15 ENCOUNTER — HOSPITAL ENCOUNTER (OUTPATIENT)
Dept: LAB | Facility: MEDICAL CENTER | Age: 25
End: 2025-04-15
Payer: COMMERCIAL

## 2025-04-15 LAB
25(OH)D3 SERPL-MCNC: 34 NG/ML (ref 30–100)
DHEA-S SERPL-MCNC: 138 UG/DL (ref 148–407)
ESTRADIOL SERPL-MCNC: 52.8 PG/ML
FSH SERPL-ACNC: 5.4 MIU/ML
LH SERPL-ACNC: 8.2 IU/L
T3FREE SERPL-MCNC: 3.91 PG/ML (ref 2–4.4)
T4 FREE SERPL-MCNC: 1.18 NG/DL (ref 0.93–1.7)
THYROPEROXIDASE AB SERPL-ACNC: 22.8 IU/ML (ref 0–9)
TSH SERPL-ACNC: 6.5 UIU/ML (ref 0.38–5.33)
VIT B12 SERPL-MCNC: 663 PG/ML (ref 211–911)

## 2025-04-15 PROCEDURE — 86376 MICROSOMAL ANTIBODY EACH: CPT

## 2025-04-15 PROCEDURE — 84270 ASSAY OF SEX HORMONE GLOBUL: CPT

## 2025-04-15 PROCEDURE — 82670 ASSAY OF TOTAL ESTRADIOL: CPT

## 2025-04-15 PROCEDURE — 84443 ASSAY THYROID STIM HORMONE: CPT

## 2025-04-15 PROCEDURE — 83001 ASSAY OF GONADOTROPIN (FSH): CPT

## 2025-04-15 PROCEDURE — 83002 ASSAY OF GONADOTROPIN (LH): CPT

## 2025-04-15 PROCEDURE — 82607 VITAMIN B-12: CPT

## 2025-04-15 PROCEDURE — 84439 ASSAY OF FREE THYROXINE: CPT

## 2025-04-15 PROCEDURE — 82157 ASSAY OF ANDROSTENEDIONE: CPT

## 2025-04-15 PROCEDURE — 36415 COLL VENOUS BLD VENIPUNCTURE: CPT

## 2025-04-15 PROCEDURE — 82306 VITAMIN D 25 HYDROXY: CPT

## 2025-04-15 PROCEDURE — 84403 ASSAY OF TOTAL TESTOSTERONE: CPT

## 2025-04-15 PROCEDURE — 84481 FREE ASSAY (FT-3): CPT

## 2025-04-15 PROCEDURE — 82627 DEHYDROEPIANDROSTERONE: CPT

## 2025-04-15 PROCEDURE — 82626 DEHYDROEPIANDROSTERONE: CPT

## 2025-04-18 LAB — SHBG SERPL-SCNC: 18 NMOL/L (ref 25–122)

## 2025-04-19 LAB
ANDROST SERPL-MCNC: 1.57 NG/ML (ref 0.26–2.14)
DHEA SERPL-MCNC: 4.57 NG/ML (ref 1.33–7.78)
TESTOST SERPL-MCNC: 41 NG/DL (ref 9–55)

## 2025-04-23 ENCOUNTER — PATIENT MESSAGE (OUTPATIENT)
Dept: HEMATOLOGY ONCOLOGY | Facility: MEDICAL CENTER | Age: 25
End: 2025-04-23
Payer: COMMERCIAL

## 2025-04-23 DIAGNOSIS — I26.99 PULMONARY EMBOLISM, BILATERAL (HCC): ICD-10-CM

## 2025-05-03 ENCOUNTER — HOSPITAL ENCOUNTER (OUTPATIENT)
Dept: RADIOLOGY | Facility: MEDICAL CENTER | Age: 25
End: 2025-05-03
Attending: STUDENT IN AN ORGANIZED HEALTH CARE EDUCATION/TRAINING PROGRAM
Payer: COMMERCIAL

## 2025-05-03 DIAGNOSIS — I26.99 PULMONARY EMBOLISM, BILATERAL (HCC): ICD-10-CM

## 2025-05-03 PROCEDURE — 71275 CT ANGIOGRAPHY CHEST: CPT

## 2025-05-03 PROCEDURE — 700117 HCHG RX CONTRAST REV CODE 255: Performed by: STUDENT IN AN ORGANIZED HEALTH CARE EDUCATION/TRAINING PROGRAM

## 2025-05-03 RX ADMIN — IOHEXOL 65 ML: 350 INJECTION, SOLUTION INTRAVENOUS at 13:32

## 2025-05-05 ENCOUNTER — RESULTS FOLLOW-UP (OUTPATIENT)
Dept: MEDICAL GROUP | Facility: IMAGING CENTER | Age: 25
End: 2025-05-05
Payer: COMMERCIAL

## 2025-05-16 ENCOUNTER — HOSPITAL ENCOUNTER (OUTPATIENT)
Dept: LAB | Facility: MEDICAL CENTER | Age: 25
End: 2025-05-16
Attending: STUDENT IN AN ORGANIZED HEALTH CARE EDUCATION/TRAINING PROGRAM
Payer: COMMERCIAL

## 2025-05-16 DIAGNOSIS — I26.99 PULMONARY EMBOLISM, BILATERAL (HCC): ICD-10-CM

## 2025-05-16 LAB
APTT PPP: 29.2 SEC (ref 24.7–36)
INR PPP: 1.01 (ref 0.87–1.13)
PROTHROMBIN TIME: 13.3 SEC (ref 12–14.6)

## 2025-05-16 PROCEDURE — 36415 COLL VENOUS BLD VENIPUNCTURE: CPT

## 2025-05-16 PROCEDURE — 85610 PROTHROMBIN TIME: CPT

## 2025-05-16 PROCEDURE — 85730 THROMBOPLASTIN TIME PARTIAL: CPT

## 2025-05-16 PROCEDURE — 85300 ANTITHROMBIN III ACTIVITY: CPT

## 2025-05-16 PROCEDURE — 81240 F2 GENE: CPT

## 2025-05-19 LAB — AT III ACT/NOR PPP CHRO: 104 % (ref 76–128)

## 2025-05-21 ENCOUNTER — HOSPITAL ENCOUNTER (OUTPATIENT)
Dept: HEMATOLOGY ONCOLOGY | Facility: MEDICAL CENTER | Age: 25
End: 2025-05-21
Attending: STUDENT IN AN ORGANIZED HEALTH CARE EDUCATION/TRAINING PROGRAM
Payer: COMMERCIAL

## 2025-05-21 DIAGNOSIS — I26.99 PULMONARY EMBOLISM, BILATERAL (HCC): ICD-10-CM

## 2025-05-21 DIAGNOSIS — Z79.899 ENCOUNTER FOR LONG-TERM (CURRENT) USE OF HIGH-RISK MEDICATION: ICD-10-CM

## 2025-05-21 PROCEDURE — 99214 OFFICE O/P EST MOD 30 MIN: CPT | Mod: 95 | Performed by: STUDENT IN AN ORGANIZED HEALTH CARE EDUCATION/TRAINING PROGRAM

## 2025-05-21 ASSESSMENT — ENCOUNTER SYMPTOMS
BLURRED VISION: 0
WEIGHT LOSS: 0
SPUTUM PRODUCTION: 0
FEVER: 0
TREMORS: 0
NECK PAIN: 0
SHORTNESS OF BREATH: 0
ORTHOPNEA: 0
SENSORY CHANGE: 0
MEMORY LOSS: 0
TINGLING: 0
WHEEZING: 0
HEARTBURN: 0
SORE THROAT: 0
COUGH: 0
VOMITING: 0
DIZZINESS: 0
PALPITATIONS: 0
DEPRESSION: 0
ABDOMINAL PAIN: 0
CHILLS: 0
BRUISES/BLEEDS EASILY: 0
FOCAL WEAKNESS: 0
HEADACHES: 0
NAUSEA: 0

## 2025-05-21 NOTE — PROGRESS NOTES
Consult Note: Hematology/Oncology     Primary Care:  Ani Siddiqui P.A.-C.    Chief Complaint   Patient presents with    Follow-Up     2 month ;Bilateral PE       Current Treatment: Eliquis 5mg BID    Prior Treatment: None    Subjective:   History of Presenting Illness:  Courtney Gayle is a 24 y.o. female who presents with a recent pulmonary embolism.      Patient reports that in February she was diagnosed with a pulmonary embolism.  She had a CTA which demonstrated  Bilateral pulmonary emboli, and a probable pulmonary infarct in the left lower lobe.  Her PE was treated initially with weight-based enoxaparin and transition to Eliquis.  She was discharged with room air.    She was advised at that time to discontinue her birth control.    In January she was traveling by car, to  from [place]. She did stop every several hours.     Interval History    Today we discussed the results the results of ATIII functional and factor II DNA alaysis was normal.  INR and PTT were wnl.    Patient reports that she is doing well.    She reports that she had a final scan which showed resolution of the clots.    Past Medical History:   Diagnosis Date    Anxiety     Hypertension         No past surgical history on file.    Social History     Tobacco Use    Smoking status: Never    Smokeless tobacco: Never   Vaping Use    Vaping status: Never Used   Substance Use Topics    Alcohol use: Yes     Comment: Occasional    Drug use: Never        Family History   Problem Relation Age of Onset    Alcohol abuse Father     Alcohol abuse Maternal Grandfather     Psychiatric Illness Sister         Bipolar disorder II       No Known Allergies    Current Outpatient Medications   Medication Sig Dispense Refill    apixaban (ELIQUIS) 5mg Tab Take 1 Tablet by mouth 2 times a day. 10 Tablet 0    multivitamin Tab Take 1 Tablet by mouth every day.       No current facility-administered medications for this encounter.       Review of Systems    Constitutional:  Negative for chills, fever, malaise/fatigue and weight loss.   HENT:  Negative for congestion, ear pain, nosebleeds and sore throat.    Eyes:  Negative for blurred vision.   Respiratory:  Negative for cough, sputum production, shortness of breath and wheezing.    Cardiovascular:  Negative for chest pain, palpitations, orthopnea and leg swelling.   Gastrointestinal:  Negative for abdominal pain, heartburn, nausea and vomiting.   Genitourinary:  Negative for dysuria, frequency and urgency.   Musculoskeletal:  Negative for neck pain.   Neurological:  Negative for dizziness, tingling, tremors, sensory change, focal weakness and headaches.   Endo/Heme/Allergies:  Does not bruise/bleed easily.   Psychiatric/Behavioral:  Negative for depression, memory loss and suicidal ideas.    All other systems reviewed and are negative.      Problem list, medications, and allergies reviewed by myself today in Epic.     Objective:   There were no vitals filed for this visit.    DESC; KARNOFSKY SCALE WITH ECOG EQUIVALENT: 90, Able to carry on normal activity; minor signs or symptoms of disease (ECOG equivalent 0)    DISTRESS LEVEL: no apparent distress    Not done due to virtual visit  Physical Exam  Constitutional:       General: She is not in acute distress.     Appearance: Normal appearance. She is not ill-appearing.   HENT:      Head: Normocephalic and atraumatic.      Nose: Nose normal.      Mouth/Throat:      Mouth: Mucous membranes are moist.      Pharynx: No oropharyngeal exudate or posterior oropharyngeal erythema.   Eyes:      General: No scleral icterus.     Conjunctiva/sclera: Conjunctivae normal.      Pupils: Pupils are equal, round, and reactive to light.   Cardiovascular:      Rate and Rhythm: Normal rate and regular rhythm.      Pulses: Normal pulses.      Heart sounds: Normal heart sounds. No murmur heard.     No friction rub. No gallop.   Pulmonary:      Effort: Pulmonary effort is normal. No  respiratory distress.      Breath sounds: Normal breath sounds. No stridor. No wheezing, rhonchi or rales.   Chest:      Chest wall: No tenderness.   Abdominal:      General: Abdomen is flat. Bowel sounds are normal. There is no distension.      Palpations: Abdomen is soft. There is no mass.      Tenderness: There is no abdominal tenderness. There is no guarding.   Musculoskeletal:         General: No swelling, tenderness or deformity. Normal range of motion.      Cervical back: Normal range of motion and neck supple. No rigidity or tenderness.      Right lower leg: No edema.      Left lower leg: No edema.   Skin:     General: Skin is warm and dry.      Coloration: Skin is not jaundiced or pale.      Findings: No bruising, erythema or rash.   Neurological:      General: No focal deficit present.      Mental Status: She is alert and oriented to person, place, and time. Mental status is at baseline.      Sensory: No sensory deficit.      Motor: No weakness.      Coordination: Coordination normal.      Gait: Gait normal.   Psychiatric:         Mood and Affect: Mood normal.         Behavior: Behavior normal.         Thought Content: Thought content normal.         Judgment: Judgment normal.       Labs:   Most recent labs reviewed.  Please see the lab tab of chart review    Imaging:   Most recent images below have been independently reviewed by me.  Please see the imaging tab of chart review      Assessment/Plan:       Ms. Gayle is a 24-year-old female who presents today with a recurrent pulmonary embolism.  Currently on Eliquis 5 mg twice daily.    We again discussed the reasons why she likely had a pulmonary embolism.  At that time she had prolonged sedentary state, home control and obesity.    She is being followed by an endocrinologist who recently put her on Synthroid.    We have previously discussed that the [employer] Society of hematology does not recommend a thrombophilia workup in the setting of a first  provoked blood clot.    She did have Antithrombin III functional and factor II DNA analysis ordered recently.  Antithrombin III functional was normal and factor II DNA analysis is still pending.  No abnormalities in her INR or PTT.    She has now finished her 3 months of anticoagulation.  I recommended against extended anticoagulation    Plan  - Stop anticoagulation  - No need to do extensive anticoagulation past 3 months  - Patient counseled to follow-up on factor II DNA analysis and if there is an abnormal results will contact me but I recommend any additional thrombophilia workup given that this is her first clot and we have a reason for the clot i.e. her birth control  - Patient to see me back as needed in the future    #2.  Hypothyroidism  - Continue on Synthroid per endocrine    #3. Obesity  -continue healthy lifestyle    Patient have chronic medical conditions that are currently being managed.  She is on a prescription medication that requires monitoring.  Presenting for follow-up exam with me.  I interpreted multiple laboratory results ordered by another physician    No follow-ups on file.     Any questions and concerns raised by the patient were addressed and answered. Patient denies any further questions.  Patient encouraged to call the office with any concerns or issues.     Essie Watkins M.D.  Hematology/Oncology    This evaluation was conducted via Teams using secure and encrypted videoconferencing technology. The patient was in their home in the [facility].    The patient's identity was confirmed and verbal consent was obtained for this virtual visit.

## 2025-05-22 ENCOUNTER — RESULTS FOLLOW-UP (OUTPATIENT)
Dept: MEDICAL GROUP | Facility: IMAGING CENTER | Age: 25
End: 2025-05-22
Payer: COMMERCIAL

## 2025-05-23 LAB — F2 C.20210G>A GENO BLD/T: NEGATIVE

## 2025-07-25 ENCOUNTER — HOSPITAL ENCOUNTER (OUTPATIENT)
Dept: LAB | Facility: MEDICAL CENTER | Age: 25
End: 2025-07-25
Payer: COMMERCIAL

## 2025-07-25 LAB
T3FREE SERPL-MCNC: 3.25 PG/ML (ref 2–4.4)
T4 FREE SERPL-MCNC: 1.4 NG/DL (ref 0.93–1.7)
TSH SERPL-ACNC: 1.77 UIU/ML (ref 0.38–5.33)

## 2025-07-25 PROCEDURE — 84443 ASSAY THYROID STIM HORMONE: CPT

## 2025-07-25 PROCEDURE — 36415 COLL VENOUS BLD VENIPUNCTURE: CPT

## 2025-07-25 PROCEDURE — 84439 ASSAY OF FREE THYROXINE: CPT

## 2025-07-25 PROCEDURE — 84481 FREE ASSAY (FT-3): CPT
